# Patient Record
Sex: MALE | Race: WHITE | NOT HISPANIC OR LATINO | Employment: FULL TIME | URBAN - METROPOLITAN AREA
[De-identification: names, ages, dates, MRNs, and addresses within clinical notes are randomized per-mention and may not be internally consistent; named-entity substitution may affect disease eponyms.]

---

## 2021-03-12 ENCOUNTER — APPOINTMENT (OUTPATIENT)
Dept: GENERAL RADIOLOGY | Facility: HOSPITAL | Age: 61
End: 2021-03-12

## 2021-03-12 ENCOUNTER — HOSPITAL ENCOUNTER (EMERGENCY)
Facility: HOSPITAL | Age: 61
End: 2021-03-12

## 2021-03-12 ENCOUNTER — HOSPITAL ENCOUNTER (OUTPATIENT)
Facility: HOSPITAL | Age: 61
Setting detail: OBSERVATION
Discharge: HOME | End: 2021-03-13
Attending: EMERGENCY MEDICINE | Admitting: INTERNAL MEDICINE

## 2021-03-12 ENCOUNTER — APPOINTMENT (OUTPATIENT)
Dept: CT IMAGING | Facility: HOSPITAL | Age: 61
End: 2021-03-12

## 2021-03-12 DIAGNOSIS — F10.929 ALCOHOLIC INTOXICATION WITH COMPLICATION (HCC): ICD-10-CM

## 2021-03-12 DIAGNOSIS — S01.01XA LACERATION OF SCALP, INITIAL ENCOUNTER: ICD-10-CM

## 2021-03-12 DIAGNOSIS — S32.591A CLOSED FRACTURE OF RAMUS OF RIGHT PUBIS, INITIAL ENCOUNTER (HCC): Primary | ICD-10-CM
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
## 2021-03-12 LAB
ALBUMIN SERPL-MCNC: 4.5 G/DL (ref 3.5–5.2)
ALBUMIN/GLOB SERPL: 2 G/DL
ALP SERPL-CCNC: 75 U/L (ref 39–117)
ALT SERPL W P-5'-P-CCNC: 36 U/L (ref 1–41)
ANION GAP SERPL CALCULATED.3IONS-SCNC: 15.2 MMOL/L (ref 5–15)
AST SERPL-CCNC: 26 U/L (ref 1–40)
BASOPHILS # BLD AUTO: 0.06 10*3/MM3 (ref 0–0.2)
BASOPHILS NFR BLD AUTO: 0.6 % (ref 0–1.5)
BILIRUB SERPL-MCNC: 0.3 MG/DL (ref 0–1.2)
BUN SERPL-MCNC: 10 MG/DL (ref 8–23)
BUN/CREAT SERPL: 12.3 (ref 7–25)
CALCIUM SPEC-SCNC: 8.9 MG/DL (ref 8.6–10.5)
CHLORIDE SERPL-SCNC: 103 MMOL/L (ref 98–107)
CO2 SERPL-SCNC: 24.8 MMOL/L (ref 22–29)
CREAT SERPL-MCNC: 0.81 MG/DL (ref 0.76–1.27)
DEPRECATED RDW RBC AUTO: 40.6 FL (ref 37–54)
EOSINOPHIL # BLD AUTO: 0.36 10*3/MM3 (ref 0–0.4)
EOSINOPHIL NFR BLD AUTO: 3.6 % (ref 0.3–6.2)
ERYTHROCYTE [DISTWIDTH] IN BLOOD BY AUTOMATED COUNT: 11.8 % (ref 12.3–15.4)
ETHANOL BLD-MCNC: 177 MG/DL (ref 0–10)
ETHANOL UR QL: 0.18 %
GFR SERPL CREATININE-BSD FRML MDRD: 97 ML/MIN/1.73
GLOBULIN UR ELPH-MCNC: 2.2 GM/DL
GLUCOSE SERPL-MCNC: 133 MG/DL (ref 65–99)
HCT VFR BLD AUTO: 41.7 % (ref 37.5–51)
HGB BLD-MCNC: 14.4 G/DL (ref 13–17.7)
IMM GRANULOCYTES # BLD AUTO: 0.14 10*3/MM3 (ref 0–0.05)
IMM GRANULOCYTES NFR BLD AUTO: 1.4 % (ref 0–0.5)
LYMPHOCYTES # BLD AUTO: 1.93 10*3/MM3 (ref 0.7–3.1)
LYMPHOCYTES NFR BLD AUTO: 19.5 % (ref 19.6–45.3)
MCH RBC QN AUTO: 32.7 PG (ref 26.6–33)
MCHC RBC AUTO-ENTMCNC: 34.5 G/DL (ref 31.5–35.7)
MCV RBC AUTO: 94.8 FL (ref 79–97)
MONOCYTES # BLD AUTO: 0.6 10*3/MM3 (ref 0.1–0.9)
MONOCYTES NFR BLD AUTO: 6.1 % (ref 5–12)
NEUTROPHILS NFR BLD AUTO: 6.81 10*3/MM3 (ref 1.7–7)
NEUTROPHILS NFR BLD AUTO: 68.8 % (ref 42.7–76)
NRBC BLD AUTO-RTO: 0 /100 WBC (ref 0–0.2)
PLATELET # BLD AUTO: 246 10*3/MM3 (ref 140–450)
PMV BLD AUTO: 8.9 FL (ref 6–12)
POTASSIUM SERPL-SCNC: 3.9 MMOL/L (ref 3.5–5.2)
PROT SERPL-MCNC: 6.7 G/DL (ref 6–8.5)
RBC # BLD AUTO: 4.4 10*6/MM3 (ref 4.14–5.8)
SODIUM SERPL-SCNC: 143 MMOL/L (ref 136–145)
WBC # BLD AUTO: 9.9 10*3/MM3 (ref 3.4–10.8)

## 2021-03-12 PROCEDURE — 73502 X-RAY EXAM HIP UNI 2-3 VIEWS: CPT

## 2021-03-12 PROCEDURE — 90715 TDAP VACCINE 7 YRS/> IM: CPT | Performed by: EMERGENCY MEDICINE

## 2021-03-12 PROCEDURE — 99285 EMERGENCY DEPT VISIT HI MDM: CPT

## 2021-03-12 PROCEDURE — 85025 COMPLETE CBC W/AUTO DIFF WBC: CPT | Performed by: EMERGENCY MEDICINE

## 2021-03-12 PROCEDURE — 25010000002 TDAP 5-2.5-18.5 LF-MCG/0.5 SUSPENSION: Performed by: EMERGENCY MEDICINE

## 2021-03-12 PROCEDURE — 82077 ASSAY SPEC XCP UR&BREATH IA: CPT | Performed by: EMERGENCY MEDICINE

## 2021-03-12 PROCEDURE — 72125 CT NECK SPINE W/O DYE: CPT

## 2021-03-12 PROCEDURE — 80053 COMPREHEN METABOLIC PANEL: CPT | Performed by: EMERGENCY MEDICINE

## 2021-03-12 PROCEDURE — 70450 CT HEAD/BRAIN W/O DYE: CPT

## 2021-03-12 PROCEDURE — 90471 IMMUNIZATION ADMIN: CPT | Performed by: EMERGENCY MEDICINE

## 2021-03-12 RX ORDER — SODIUM CHLORIDE 0.9 % (FLUSH) 0.9 %
10 SYRINGE (ML) INJECTION AS NEEDED
Status: DISCONTINUED | OUTPATIENT
Start: 2021-03-12 | End: 2021-03-13 | Stop reason: HOSPADM

## 2021-03-12 RX ORDER — LIDOCAINE HYDROCHLORIDE 10 MG/ML
10 INJECTION, SOLUTION EPIDURAL; INFILTRATION; INTRACAUDAL; PERINEURAL ONCE
Status: COMPLETED | OUTPATIENT
Start: 2021-03-12 | End: 2021-03-12

## 2021-03-12 RX ADMIN — TETANUS TOXOID, REDUCED DIPHTHERIA TOXOID AND ACELLULAR PERTUSSIS VACCINE, ADSORBED 0.5 ML: 5; 2.5; 8; 8; 2.5 SUSPENSION INTRAMUSCULAR at 22:19

## 2021-03-12 RX ADMIN — LIDOCAINE HYDROCHLORIDE 10 ML: 10 INJECTION, SOLUTION EPIDURAL; INFILTRATION; INTRACAUDAL; PERINEURAL at 21:55

## 2021-03-13 ENCOUNTER — APPOINTMENT (OUTPATIENT)
Dept: GENERAL RADIOLOGY | Facility: HOSPITAL | Age: 61
End: 2021-03-13

## 2021-03-13 ENCOUNTER — APPOINTMENT (OUTPATIENT)
Dept: CT IMAGING | Facility: HOSPITAL | Age: 61
End: 2021-03-13

## 2021-03-13 VITALS
HEIGHT: 70 IN | TEMPERATURE: 97.3 F | HEART RATE: 93 BPM | OXYGEN SATURATION: 97 % | BODY MASS INDEX: 31.53 KG/M2 | SYSTOLIC BLOOD PRESSURE: 158 MMHG | WEIGHT: 220.24 LBS | RESPIRATION RATE: 16 BRPM | DIASTOLIC BLOOD PRESSURE: 87 MMHG

## 2021-03-13 PROBLEM — I10 HYPERTENSION: Status: ACTIVE | Noted: 2021-03-13

## 2021-03-13 PROBLEM — S32.591A CLOSED FRACTURE OF RAMUS OF RIGHT PUBIS: Status: ACTIVE | Noted: 2021-03-13

## 2021-03-13 PROBLEM — F10.10 ETOH ABUSE: Status: ACTIVE | Noted: 2021-03-13

## 2021-03-13 LAB
ANION GAP SERPL CALCULATED.3IONS-SCNC: 11.5 MMOL/L (ref 5–15)
BUN SERPL-MCNC: 11 MG/DL (ref 8–23)
BUN/CREAT SERPL: 15.1 (ref 7–25)
CALCIUM SPEC-SCNC: 8.6 MG/DL (ref 8.6–10.5)
CHLORIDE SERPL-SCNC: 104 MMOL/L (ref 98–107)
CO2 SERPL-SCNC: 25.5 MMOL/L (ref 22–29)
CREAT SERPL-MCNC: 0.73 MG/DL (ref 0.76–1.27)
DEPRECATED RDW RBC AUTO: 38.7 FL (ref 37–54)
ERYTHROCYTE [DISTWIDTH] IN BLOOD BY AUTOMATED COUNT: 11.7 % (ref 12.3–15.4)
GFR SERPL CREATININE-BSD FRML MDRD: 109 ML/MIN/1.73
GLUCOSE SERPL-MCNC: 117 MG/DL (ref 65–99)
HCT VFR BLD AUTO: 37.9 % (ref 37.5–51)
HGB BLD-MCNC: 13.2 G/DL (ref 13–17.7)
MCH RBC QN AUTO: 31.7 PG (ref 26.6–33)
MCHC RBC AUTO-ENTMCNC: 34.8 G/DL (ref 31.5–35.7)
MCV RBC AUTO: 91.1 FL (ref 79–97)
PLATELET # BLD AUTO: 236 10*3/MM3 (ref 140–450)
PMV BLD AUTO: 9 FL (ref 6–12)
POTASSIUM SERPL-SCNC: 4.1 MMOL/L (ref 3.5–5.2)
RBC # BLD AUTO: 4.16 10*6/MM3 (ref 4.14–5.8)
SARS-COV-2 ORF1AB RESP QL NAA+PROBE: NOT DETECTED
SODIUM SERPL-SCNC: 141 MMOL/L (ref 136–145)
WBC # BLD AUTO: 13.41 10*3/MM3 (ref 3.4–10.8)

## 2021-03-13 PROCEDURE — 25010000002 HYDROMORPHONE PER 4 MG: Performed by: HOSPITALIST

## 2021-03-13 PROCEDURE — 36415 COLL VENOUS BLD VENIPUNCTURE: CPT | Performed by: NURSE PRACTITIONER

## 2021-03-13 PROCEDURE — G0378 HOSPITAL OBSERVATION PER HR: HCPCS

## 2021-03-13 PROCEDURE — 72170 X-RAY EXAM OF PELVIS: CPT

## 2021-03-13 PROCEDURE — 25010000002 MORPHINE PER 10 MG: Performed by: EMERGENCY MEDICINE

## 2021-03-13 PROCEDURE — 25010000002 MORPHINE PER 10 MG: Performed by: NURSE PRACTITIONER

## 2021-03-13 PROCEDURE — 96361 HYDRATE IV INFUSION ADD-ON: CPT

## 2021-03-13 PROCEDURE — 85027 COMPLETE CBC AUTOMATED: CPT | Performed by: NURSE PRACTITIONER

## 2021-03-13 PROCEDURE — 63710000001 ONDANSETRON PER 8 MG: Performed by: NURSE PRACTITIONER

## 2021-03-13 PROCEDURE — 96375 TX/PRO/DX INJ NEW DRUG ADDON: CPT

## 2021-03-13 PROCEDURE — 96365 THER/PROPH/DIAG IV INF INIT: CPT

## 2021-03-13 PROCEDURE — 96376 TX/PRO/DX INJ SAME DRUG ADON: CPT

## 2021-03-13 PROCEDURE — 80048 BASIC METABOLIC PNL TOTAL CA: CPT | Performed by: NURSE PRACTITIONER

## 2021-03-13 PROCEDURE — 74176 CT ABD & PELVIS W/O CONTRAST: CPT

## 2021-03-13 PROCEDURE — 25010000002 THIAMINE PER 100 MG: Performed by: NURSE PRACTITIONER

## 2021-03-13 PROCEDURE — 25010000002 ONDANSETRON PER 1 MG: Performed by: EMERGENCY MEDICINE

## 2021-03-13 PROCEDURE — U0004 COV-19 TEST NON-CDC HGH THRU: HCPCS | Performed by: EMERGENCY MEDICINE

## 2021-03-13 PROCEDURE — 97161 PT EVAL LOW COMPLEX 20 MIN: CPT

## 2021-03-13 PROCEDURE — 97110 THERAPEUTIC EXERCISES: CPT

## 2021-03-13 PROCEDURE — 99203 OFFICE O/P NEW LOW 30 MIN: CPT | Performed by: ORTHOPAEDIC SURGERY

## 2021-03-13 RX ORDER — SODIUM CHLORIDE 0.9 % (FLUSH) 0.9 %
10 SYRINGE (ML) INJECTION AS NEEDED
Status: DISCONTINUED | OUTPATIENT
Start: 2021-03-13 | End: 2021-03-13 | Stop reason: HOSPADM

## 2021-03-13 RX ORDER — AMLODIPINE BESYLATE 2.5 MG/1
2.5 TABLET ORAL DAILY
COMMUNITY
Start: 2021-03-05 | End: 2021-05-21

## 2021-03-13 RX ORDER — HYDROMORPHONE HYDROCHLORIDE 1 MG/ML
0.5 INJECTION, SOLUTION INTRAMUSCULAR; INTRAVENOUS; SUBCUTANEOUS EVERY 4 HOURS PRN
Status: DISCONTINUED | OUTPATIENT
Start: 2021-03-13 | End: 2021-03-13

## 2021-03-13 RX ORDER — LORAZEPAM 1 MG/1
1 TABLET ORAL
Status: DISCONTINUED | OUTPATIENT
Start: 2021-03-13 | End: 2021-03-13 | Stop reason: HOSPADM

## 2021-03-13 RX ORDER — LORAZEPAM 1 MG/1
2 TABLET ORAL
Status: DISCONTINUED | OUTPATIENT
Start: 2021-03-13 | End: 2021-03-13 | Stop reason: HOSPADM

## 2021-03-13 RX ORDER — HYDROCODONE BITARTRATE AND ACETAMINOPHEN 7.5; 325 MG/1; MG/1
1 TABLET ORAL EVERY 4 HOURS PRN
Status: DISCONTINUED | OUTPATIENT
Start: 2021-03-13 | End: 2021-03-13 | Stop reason: HOSPADM

## 2021-03-13 RX ORDER — FOLIC ACID 1 MG/1
1 TABLET ORAL DAILY
Status: DISCONTINUED | OUTPATIENT
Start: 2021-03-14 | End: 2021-03-13 | Stop reason: HOSPADM

## 2021-03-13 RX ORDER — ASPIRIN 81 MG/1
81 TABLET, CHEWABLE ORAL DAILY
Status: DISCONTINUED | OUTPATIENT
Start: 2021-03-13 | End: 2021-03-13 | Stop reason: HOSPADM

## 2021-03-13 RX ORDER — SODIUM CHLORIDE 9 MG/ML
75 INJECTION, SOLUTION INTRAVENOUS CONTINUOUS
Status: DISCONTINUED | OUTPATIENT
Start: 2021-03-13 | End: 2021-03-13

## 2021-03-13 RX ORDER — MORPHINE SULFATE 2 MG/ML
2 INJECTION, SOLUTION INTRAMUSCULAR; INTRAVENOUS
Status: DISCONTINUED | OUTPATIENT
Start: 2021-03-13 | End: 2021-03-13 | Stop reason: HOSPADM

## 2021-03-13 RX ORDER — ACETAMINOPHEN 160 MG/5ML
650 SOLUTION ORAL EVERY 4 HOURS PRN
Status: DISCONTINUED | OUTPATIENT
Start: 2021-03-13 | End: 2021-03-13 | Stop reason: HOSPADM

## 2021-03-13 RX ORDER — LORAZEPAM 2 MG/ML
1 INJECTION INTRAMUSCULAR
Status: DISCONTINUED | OUTPATIENT
Start: 2021-03-13 | End: 2021-03-13 | Stop reason: HOSPADM

## 2021-03-13 RX ORDER — CALCIUM CARBONATE 200(500)MG
2 TABLET,CHEWABLE ORAL 2 TIMES DAILY PRN
Status: DISCONTINUED | OUTPATIENT
Start: 2021-03-13 | End: 2021-03-13 | Stop reason: HOSPADM

## 2021-03-13 RX ORDER — ONDANSETRON 2 MG/ML
4 INJECTION INTRAMUSCULAR; INTRAVENOUS EVERY 6 HOURS PRN
Status: DISCONTINUED | OUTPATIENT
Start: 2021-03-13 | End: 2021-03-13 | Stop reason: HOSPADM

## 2021-03-13 RX ORDER — HYDROCODONE BITARTRATE AND ACETAMINOPHEN 7.5; 325 MG/1; MG/1
1 TABLET ORAL EVERY 4 HOURS PRN
Qty: 15 TABLET | Refills: 0 | Status: SHIPPED | OUTPATIENT
Start: 2021-03-13 | End: 2021-03-13

## 2021-03-13 RX ORDER — DIPHENOXYLATE HYDROCHLORIDE AND ATROPINE SULFATE 2.5; .025 MG/1; MG/1
1 TABLET ORAL DAILY
Status: DISCONTINUED | OUTPATIENT
Start: 2021-03-14 | End: 2021-03-13 | Stop reason: HOSPADM

## 2021-03-13 RX ORDER — ASPIRIN 81 MG/1
81 TABLET, CHEWABLE ORAL DAILY
COMMUNITY

## 2021-03-13 RX ORDER — ONDANSETRON 2 MG/ML
4 INJECTION INTRAMUSCULAR; INTRAVENOUS ONCE
Status: COMPLETED | OUTPATIENT
Start: 2021-03-13 | End: 2021-03-13

## 2021-03-13 RX ORDER — HYDROCODONE BITARTRATE AND ACETAMINOPHEN 5; 325 MG/1; MG/1
1 TABLET ORAL EVERY 6 HOURS PRN
Status: DISCONTINUED | OUTPATIENT
Start: 2021-03-13 | End: 2021-03-13

## 2021-03-13 RX ORDER — ACETAMINOPHEN 325 MG/1
650 TABLET ORAL EVERY 4 HOURS PRN
Status: DISCONTINUED | OUTPATIENT
Start: 2021-03-13 | End: 2021-03-13 | Stop reason: HOSPADM

## 2021-03-13 RX ORDER — LORAZEPAM 2 MG/ML
2 INJECTION INTRAMUSCULAR
Status: DISCONTINUED | OUTPATIENT
Start: 2021-03-13 | End: 2021-03-13 | Stop reason: HOSPADM

## 2021-03-13 RX ORDER — SODIUM CHLORIDE 0.9 % (FLUSH) 0.9 %
10 SYRINGE (ML) INJECTION EVERY 12 HOURS SCHEDULED
Status: DISCONTINUED | OUTPATIENT
Start: 2021-03-13 | End: 2021-03-13 | Stop reason: HOSPADM

## 2021-03-13 RX ORDER — MORPHINE SULFATE 2 MG/ML
4 INJECTION, SOLUTION INTRAMUSCULAR; INTRAVENOUS ONCE
Status: COMPLETED | OUTPATIENT
Start: 2021-03-13 | End: 2021-03-13

## 2021-03-13 RX ORDER — DOCUSATE SODIUM 100 MG/1
100 CAPSULE, LIQUID FILLED ORAL 2 TIMES DAILY
Qty: 14 CAPSULE | Refills: 0 | Status: SHIPPED | OUTPATIENT
Start: 2021-03-13 | End: 2021-03-20

## 2021-03-13 RX ORDER — BISACODYL 5 MG/1
5 TABLET, DELAYED RELEASE ORAL DAILY PRN
Status: DISCONTINUED | OUTPATIENT
Start: 2021-03-13 | End: 2021-03-13 | Stop reason: HOSPADM

## 2021-03-13 RX ORDER — ONDANSETRON 4 MG/1
4 TABLET, FILM COATED ORAL EVERY 6 HOURS PRN
Status: DISCONTINUED | OUTPATIENT
Start: 2021-03-13 | End: 2021-03-13 | Stop reason: HOSPADM

## 2021-03-13 RX ORDER — AMLODIPINE BESYLATE 2.5 MG/1
2.5 TABLET ORAL DAILY
Status: DISCONTINUED | OUTPATIENT
Start: 2021-03-13 | End: 2021-03-13 | Stop reason: HOSPADM

## 2021-03-13 RX ORDER — MORPHINE SULFATE 2 MG/ML
2 INJECTION, SOLUTION INTRAMUSCULAR; INTRAVENOUS ONCE
Status: DISCONTINUED | OUTPATIENT
Start: 2021-03-13 | End: 2021-03-13

## 2021-03-13 RX ORDER — DOCUSATE SODIUM 100 MG/1
100 CAPSULE, LIQUID FILLED ORAL 2 TIMES DAILY
Qty: 14 CAPSULE | Refills: 0 | Status: SHIPPED | OUTPATIENT
Start: 2021-03-13 | End: 2021-03-13 | Stop reason: SDUPTHER

## 2021-03-13 RX ORDER — HYDROCODONE BITARTRATE AND ACETAMINOPHEN 7.5; 325 MG/1; MG/1
1 TABLET ORAL EVERY 4 HOURS PRN
Qty: 15 TABLET | Refills: 0 | Status: SHIPPED | OUTPATIENT
Start: 2021-03-13 | End: 2021-04-16

## 2021-03-13 RX ORDER — ACETAMINOPHEN 650 MG/1
650 SUPPOSITORY RECTAL EVERY 4 HOURS PRN
Status: DISCONTINUED | OUTPATIENT
Start: 2021-03-13 | End: 2021-03-13 | Stop reason: HOSPADM

## 2021-03-13 RX ADMIN — SODIUM CHLORIDE, PRESERVATIVE FREE 10 ML: 5 INJECTION INTRAVENOUS at 00:20

## 2021-03-13 RX ADMIN — ONDANSETRON 4 MG: 2 INJECTION INTRAMUSCULAR; INTRAVENOUS at 00:19

## 2021-03-13 RX ADMIN — MORPHINE SULFATE 2 MG: 2 INJECTION, SOLUTION INTRAMUSCULAR; INTRAVENOUS at 03:02

## 2021-03-13 RX ADMIN — THIAMINE HYDROCHLORIDE 100 MG: 100 INJECTION, SOLUTION INTRAMUSCULAR; INTRAVENOUS at 03:04

## 2021-03-13 RX ADMIN — MORPHINE SULFATE 4 MG: 2 INJECTION, SOLUTION INTRAMUSCULAR; INTRAVENOUS at 00:20

## 2021-03-13 RX ADMIN — AMLODIPINE BESYLATE 2.5 MG: 2.5 TABLET ORAL at 08:56

## 2021-03-13 RX ADMIN — ACETAMINOPHEN 650 MG: 325 TABLET, FILM COATED ORAL at 03:03

## 2021-03-13 RX ADMIN — HYDROCODONE BITARTRATE AND ACETAMINOPHEN 1 TABLET: 7.5; 325 TABLET ORAL at 13:35

## 2021-03-13 RX ADMIN — ONDANSETRON HYDROCHLORIDE 4 MG: 4 TABLET, FILM COATED ORAL at 03:02

## 2021-03-13 RX ADMIN — SODIUM CHLORIDE, PRESERVATIVE FREE 10 ML: 5 INJECTION INTRAVENOUS at 08:56

## 2021-03-13 RX ADMIN — ASPIRIN 81 MG: 81 TABLET, CHEWABLE ORAL at 08:56

## 2021-03-13 RX ADMIN — SODIUM CHLORIDE 75 ML/HR: 9 INJECTION, SOLUTION INTRAVENOUS at 06:22

## 2021-03-13 RX ADMIN — HYDROCODONE BITARTRATE AND ACETAMINOPHEN 1 TABLET: 5; 325 TABLET ORAL at 08:56

## 2021-03-13 RX ADMIN — SODIUM CHLORIDE, PRESERVATIVE FREE 10 ML: 5 INJECTION INTRAVENOUS at 03:06

## 2021-03-13 RX ADMIN — HYDROCODONE BITARTRATE AND ACETAMINOPHEN 1 TABLET: 7.5; 325 TABLET ORAL at 17:40

## 2021-03-13 RX ADMIN — HYDROMORPHONE HYDROCHLORIDE 0.5 MG: 1 INJECTION, SOLUTION INTRAMUSCULAR; INTRAVENOUS; SUBCUTANEOUS at 07:28

## 2021-03-13 NOTE — PLAN OF CARE
Goal Outcome Evaluation:  Plan of Care Reviewed With: patient  Progress: no change  Outcome Summary: 61 y/o admit for mechanical fall down basement stairs tonight. Amnesic to events. wife called EMS. ETOH on board on arrival. Dr davenport consulted for ortho, closed FX of right ramus/pubis. bedfast and NPO since MN. VSS. will monitor.

## 2021-03-13 NOTE — ED NOTES
Nursing report ED to floor  Arpit Godoy  60 y.o.  male    HPI (triage note):   Chief Complaint   Patient presents with   • Fall   • Alcohol Intoxication       Admitting doctor:   Scooter Villagomez MD    Admitting diagnosis:   The primary encounter diagnosis was Closed fracture of ramus of right pubis, initial encounter (CMS/[facility]). Diagnoses of Laceration of scalp, initial encounter, Alcoholic intoxication with complication (CMS/[facility]), and Fall down stairs, initial encounter were also pertinent to this visit.    Code status:   Current Code Status     Date Active Code Status Order ID Comments User Context       Not on file    Advance Care Planning Activity          Allergies:   Patient has no known allergies.    Weight:       03/12/21  2143   Weight: 99.8 kg (220 lb)       Most recent vitals:   Vitals:    03/12/21 2200 03/12/21 2300 03/12/21 2305 03/13/21 0024   BP: 150/81 162/83  142/77   Pulse:    101   Resp:       Temp:       TempSrc:       SpO2: 95% 96% 97% 93%   Weight:       Height:           Active LDAs/IV Access:   Lines, Drains & Airways    Active LDAs     Name:   Placement date:   Placement time:   Site:   Days:    Peripheral IV 03/13/21 0019 Right Hand   03/13/21    0019    Hand   less than 1                Labs (abnormal labs have a star):   Labs Reviewed   COMPREHENSIVE METABOLIC PANEL - Abnormal; Notable for the following components:       Result Value    Glucose 133 (*)     Anion Gap 15.2 (*)     All other components within normal limits    Narrative:     GFR Normal >60  Chronic Kidney Disease <60  Kidney Failure <15     ETHANOL - Abnormal; Notable for the following components:    Ethanol 177 (*)     All other components within normal limits   CBC WITH AUTO DIFFERENTIAL - Abnormal; Notable for the following components:    RDW 11.8 (*)     Lymphocyte % 19.5 (*)     Immature Grans % 1.4 (*)     Immature Grans, Absolute 0.14 (*)     All other components within normal limits   COVID PRE-OP /  PRE-PROCEDURE SCREENING ORDER (NO ISOLATION)    Narrative:     The following orders were created for panel order COVID PRE-OP / PRE-PROCEDURE SCREENING ORDER (NO ISOLATION) - Swab, Nasopharynx.  Procedure                               Abnormality         Status                     ---------                               -----------         ------                     COVID-19,APTIMA PANTHER,...[[id number]]                                                   Please view results for these tests on the individual orders.   COVID-19,APTIMA PANTHER,[facility] IN-HOUSE,NP/OP SWAB IN UTM/VTM/SALINE TRANSPORT MEDIA,24 HR TAT   CBC AND DIFFERENTIAL    Narrative:     The following orders were created for panel order CBC & Differential.  Procedure                               Abnormality         Status                     ---------                               -----------         ------                     CBC Auto Differential[[id number]]        Abnormal            Final result                 Please view results for these tests on the individual orders.       EKG:   No orders to display       Meds given in ED:   Medications   sodium chloride 0.9 % flush 10 mL (10 mL Intravenous Given 3/13/21 0020)   lidocaine PF 1% (XYLOCAINE) injection 10 mL (10 mL Other Given 3/12/21 2155)   Tdap (BOOSTRIX) injection 0.5 mL (0.5 mL Intramuscular Given 3/12/21 2219)   morphine injection 4 mg (4 mg Intravenous Given 3/13/21 0020)   ondansetron (ZOFRAN) injection 4 mg (4 mg Intravenous Given 3/13/21 0019)       Imaging results:  CT Head Without Contrast    Result Date: 3/12/2021  Electronically signed by Yulisa Nur M.D. on 03-12-21 at 2320    CT Cervical Spine Without Contrast    Result Date: 3/12/2021  Electronically signed by Moe Harrison M.D. on 03-12-21 at 2330    XR Hip With or Without Pelvis 2 - 3 View Right    Result Date: 3/12/2021  Electronically signed by Yulisa Nur M.D. on 03-12-21 at 2308      Ambulatory status:   -     Social issues:    Social History     Socioeconomic History   • Marital status:      Spouse name: Not on file   • Number of children: Not on file   • Years of education: Not on file   • Highest education level: Not on file   Tobacco Use   • Smoking status: Current Every Day Smoker     Types: Cigarettes   • Smokeless tobacco: Never Used   Substance and Sexual Activity   • Alcohol use: Yes   • Drug use: Never    Nursing report ED to floor       Maite Rivas RN  03/13/21 0055     n/a

## 2021-03-13 NOTE — ED PROVIDER NOTES
Laceration Repair    Date/Time: 3/13/2021 1:19 AM  Performed by: Jono Kahn III, PA  Authorized by: Ricardo Hollins MD     Consent:     Consent obtained:  Verbal    Risks discussed:  Infection and pain  Anesthesia (see MAR for exact dosages):     Anesthesia method:  Local infiltration    Local anesthetic:  Lidocaine 1% WITH epi  Laceration details:     Location:  Scalp    Scalp location:  Crown    Length (cm):  3.5 (Does not shave, no galea involvement)  Repair type:     Repair type:  Simple  Pre-procedure details:     Preparation:  Patient was prepped and draped in usual sterile fashion and imaging obtained to evaluate for foreign bodies  Exploration:     Wound exploration: wound explored through full range of motion and entire depth of wound probed and visualized    Treatment:     Area cleansed with:  Hibiclens and saline    Amount of cleaning:  Extensive    Irrigation volume:  750    Irrigation method:  Pressure wash  Skin repair:     Repair method:  Staples    Number of staples:  4  Approximation:     Approximation:  Close  Post-procedure details:     Dressing:  Antibiotic ointment    Patient tolerance of procedure:  Tolerated well, no immediate complications         Jono Kahn III, PA  03/13/21 0121

## 2021-03-13 NOTE — PLAN OF CARE
Problem: Adult Inpatient Plan of Care  Goal: Plan of Care Review  Outcome: Ongoing, Progressing  Flowsheets (Taken 3/13/2021 1617)  Progress: improving  Plan of Care Reviewed With: patient   Goal Outcome Evaluation:  Plan of Care Reviewed With: patient  Progress: improving     Pt arrived on the floor with a fx to the ramus of the pubis. He has been a great deal of pain all day. His pain medication was increased and he says he is doing better and would like to go home. Ortho was consulted and ordered a CT scan of the abdomin and pelvis. If ortho is okay with discharge pt can go home today and he said even this evening he would still like to go. Pt worked with pt and got him a walker to use at home. VSS. Will continue to monitor.

## 2021-03-13 NOTE — THERAPY EVALUATION
Patient Name: Arpit Godoy  : 1960    MRN: [id number]                              Today's Date: 3/13/2021       Admit Date: 3/12/2021    Visit Dx:     ICD-10-CM ICD-9-CM   1. Closed fracture of ramus of right pubis, initial encounter (CMS/[facility])  S32.591A 808.2   2. Laceration of scalp, initial encounter  S01.01XA 873.0   3. Alcoholic intoxication with complication (CMS/[facility])  F10.929 305.00   4. Fall down stairs, initial encounter  W10.8XXA E880.9     Patient Active Problem List   Diagnosis   • Closed fracture of ramus of right pubis (CMS/[facility])   • Hypertension   • ETOH abuse     Past Medical History:   Diagnosis Date   • Hypertension      Past Surgical History:   Procedure Laterality Date   • APPENDECTOMY       General Information     Row Name 21 1146          Physical Therapy Time and Intention    Document Type  evaluation;discharge evaluation/summary  -AL     Mode of Treatment  physical therapy  -AL     Row Name 21 1146          General Information    Patient Profile Reviewed  yes  -AL     Prior Level of Function  independent:  -AL     Existing Precautions/Restrictions  no known precautions/restrictions  -AL     Barriers to Rehab  none identified  -AL     Row Name 21 1146          Living Environment    Lives With  spouse  -AL     Row Name 21 1146          Home Main Entrance    Number of Stairs, Main Entrance  none  -AL       User Key  (r) = Recorded By, (t) = Taken By, (c) = Cosigned By    Initials Name Provider Type    AL Lien Loomis, PT Physical Therapist        Mobility     Row Name 21 1147          Bed Mobility    Bed Mobility  bed mobility (all) activities  -AL     All Activities, [place] (Bed Mobility)  modified independence  -AL     Row Name 21 1147          Sit-Stand Transfer    Sit-Stand [place] (Transfers)  supervision  -AL     Assistive Device (Sit-Stand Transfers)  walker, front-wheeled  -AL     Row Name 21 1147          Gait/Stairs  (Locomotion)    [place] Level (Gait)  contact guard  -AL     Assistive Device (Gait)  walker, front-wheeled  -AL     Distance in Feet (Gait)  100  -AL     Comment (Gait/Stairs)  no LOB. Not able to put a lot of wt on RLE.  -AL     Row Name 03/13/21 1147          Mobility    Extremity Weight-bearing Status  right upper extremity  -AL     Right Upper Extremity (Weight-bearing Status)  weight-bearing as tolerated (WBAT)  -AL       User Key  (r) = Recorded By, (t) = Taken By, (c) = Cosigned By    Initials Name Provider Type    Lien Chu, PT Physical Therapist        Obj/Interventions     Row Name 03/13/21 1148          Range of Motion Comprehensive    Comment, General Range of Motion  decreased on RLE.  -AL     Row Name 03/13/21 1148          Strength Comprehensive (MMT)    Comment, General Manual Muscle Testing (MMT) Assessment  decreased on RLE.  -AL       User Key  (r) = Recorded By, (t) = Taken By, (c) = Cosigned By    Initials Name Provider Type    Lien Chu, PT Physical Therapist        Goals/Plan    No documentation.       Clinical Impression     Row Name 03/13/21 1148          Pain    Additional Documentation  Pain Scale: Numbers Pre/Post-Treatment (Group)  -AL     Row Name 03/13/21 1148          Pain Scale: Numbers Pre/Post-Treatment    Pretreatment Pain Rating  5/10  -AL     Posttreatment Pain Rating  7/10  -AL     Pain Location - Side  Right  -AL     Pain Location - Orientation  lower  -AL     Pain Location  hip  -AL     Pain Intervention(s)  Ambulation/increased activity;Repositioned  -AL     Row Name 03/13/21 1148          Plan of Care Review    Plan of Care Reviewed With  patient  -AL     Row Name 03/13/21 1148          Therapy Assessment/Plan (PT)    Rehab Potential (PT)  good, to achieve stated therapy goals  -AL     Criteria for Skilled Interventions Met (PT)  no  -AL     Row Name 03/13/21 1148          Positioning and Restraints    Pre-Treatment Position  in bed  -AL     Post Treatment  Position  bed  -AL     In Bed  notified nsg;sitting EOB;call light within reach;encouraged to call for assist  -AL       User Key  (r) = Recorded By, (t) = Taken By, (c) = Cosigned By    Initials Name Provider Type    Lien Chu, PT Physical Therapist        Outcome Measures     Row Name 03/13/21 1149          How much help from another person do you currently need...    Turning from your back to your side while in flat bed without using bedrails?  4  -AL     Moving from lying on back to sitting on the side of a flat bed without bedrails?  4  -AL     Moving to and from a bed to a chair (including a wheelchair)?  4  -AL     Standing up from a chair using your arms (e.g., wheelchair, bedside chair)?  4  -AL     Climbing 3-5 steps with a railing?  3  -AL     To walk in hospital room?  3  -AL     AM-PAC 6 Clicks Score (PT)  22  -AL     Row Name 03/13/21 1141          Functional Assessment    Outcome Measure Options  AM-PAC 6 Clicks Basic Mobility (PT)  -AL       User Key  (r) = Recorded By, (t) = Taken By, (c) = Cosigned By    Initials Name Provider Type    Lien Chu, PT Physical Therapist        Physical Therapy Education                 Title: PT OT SLP Therapies (Resolved)     Topic: Physical Therapy (Resolved)     Point: Mobility training (Resolved)     Learner Progress:  Not documented in this visit.          Point: Home exercise program (Resolved)     Learner Progress:  Not documented in this visit.          Point: Body mechanics (Resolved)     Learner Progress:  Not documented in this visit.          Point: Precautions (Resolved)     Learner Progress:  Not documented in this visit.                          PT Recommendation and Plan     Plan of Care Reviewed With: patient  Outcome Summary: Skip is not appropriate for skilled PT at this time. He is functional with bed mobility, transfer, and gait; pain is the limiting factor. Once he can be on a good pain control regimen, he would be good to return  home from PT standpoint. Looking into Rwx for home use. Will not  patient on PT caseload at this time. PT wore mask, gloves, and face shield.     Time Calculation:   PT Charges     Row Name 03/13/21 1150             Time Calculation    Start Time  1118  -AL      Stop Time  1128  -AL      Time Calculation (min)  10 min  -AL      PT Received On  03/13/21  -AL        User Key  (r) = Recorded By, (t) = Taken By, (c) = Cosigned By    Initials Name Provider Type    AL Lien Loomis, PT Physical Therapist        Therapy Charges for Today     Code Description Service Date Service Provider Modifiers Qty    54243194609 HC PT EVAL LOW COMPLEXITY 2 3/13/2021 Lien Loomis, PT GP 1    73330073392 HC PT THER PROC EA 15 MIN 3/13/2021 Lien Loomis, PT GP 1          PT G-Codes  Outcome Measure Options: AM-PAC 6 Clicks Basic Mobility (PT)  AM-PAC 6 Clicks Score (PT): 22    Lien Loomis PT  3/13/2021

## 2021-03-13 NOTE — ED NOTES
I wore full protective equipment throughout this patient encounter including a face mask, goggles, and gloves. Hand hygiene was performed before donning protective equipment and after removal when leaving the room.       Maite Rivas RN  03/12/21 2506

## 2021-03-13 NOTE — ED TRIAGE NOTES
Pt from home by ems with complaints of falling down stairs and currently has a lac to back of head. Wife found pt deep asleep in basement post fall. Pt tells ems he had four beers tonight. Denies any other complaints or pain. NAD.     I wore full protective equipment throughout this patient encounter including a face mask, goggles, and gloves. Hand hygiene was performed before donning protective equipment and after removal when leaving the room.

## 2021-03-13 NOTE — ED NOTES
Pt was evaluated with use of walker, and determined to be in too much pain to walk. Spoke with pt.     Remy Uriarte, PCT  03/13/21 0001     [facility] calling to ask about the visitor requesting accommodations form. Please call to assist.

## 2021-03-13 NOTE — H&P
Patient Name:  Arpit Godoy  YOB: 1960  MRN:  [id number]  Admit Date:  3/12/2021  Patient Care Team:  Provider, No Known as PCP - General      Subjective   History Present Illness     Chief Complaint   Patient presents with   • Fall   • Alcohol Intoxication       Mr. Godoy is a 60 y.o. former smoker with a history of hypertension and alcohol abuse that presents to [facility] complaining of a fall.  He reports he was carrying food down a flight of stairs when he tripped and fell to the bottom.  He reports he hit the back of his head and he thinks he lost consciousness briefly. Per ED notes, the patient's family members found him at the bottom of the stairs asleep.  He reports right pelvic pain that is described as intermittent, moderate, and sharp in nature.  He states movement exacerbates the pain and he denies alleviating factors.  X-ray of the right hip revealed a mildly displaced fracture of the right inferior abdomen and superior pubic rami.  CT of the head revealed a right parietal scalp hematoma and laceration, but was negative for an acute intracranial process.  He reports he is a daily drinker and consumes 6 to 8 beers daily.  He denies a history of alcohol withdrawal.      History of Present Illness  Review of Systems   Constitutional: Negative for chills and fever.   HENT: Negative for congestion and sore throat.    Eyes: Negative for photophobia and visual disturbance.   Respiratory: Negative for cough and shortness of breath.    Cardiovascular: Negative for chest pain and leg swelling.   Gastrointestinal: Negative for abdominal pain, nausea and vomiting.   Endocrine: Negative for polydipsia, polyphagia and polyuria.   Genitourinary: Negative for decreased urine volume, dysuria, flank pain, frequency, hematuria and urgency.   Musculoskeletal: Positive for arthralgias and back pain. Negative for neck pain.   Skin: Negative for rash and wound.   Neurological:  Negative for dizziness, speech difficulty, weakness, numbness and headaches.        Personal History     Past Medical History:   Diagnosis Date   • Hypertension      Past Surgical History:   Procedure Laterality Date   • APPENDECTOMY       History reviewed. No pertinent family history.  Social History     Tobacco Use   • Smoking status: Former Smoker     Types: Cigarettes   • Smokeless tobacco: Never Used   Vaping Use   • Vaping Use: Every day   • Substances: Nicotine   Substance Use Topics   • Alcohol use: Yes     Comment: 6-8 beers nightly   • Drug use: Yes     Medications Prior to Admission   Medication Sig Dispense Refill Last Dose   • amLODIPine (NORVASC) 2.5 MG tablet Take 2.5 mg by mouth Daily.   3/13/2021 at Unknown time   • aspirin 81 MG chewable tablet Chew 81 mg Daily.   3/13/2021 at Unknown time     Allergies:  No Known Allergies    Objective    Objective     Vital Signs  Temp:  [97.4 °F (36.3 °C)-98.7 °F (37.1 °C)] 98.7 °F (37.1 °C)  Heart Rate:  [] 102  Resp:  [14-16] 16  BP: (142-170)/(77-84) 170/84  SpO2:  [93 %-98 %] 97 %  on   ;   Device (Oxygen Therapy): room air  Body mass index is 31.6 kg/m².    Physical Exam  Vitals and nursing note reviewed.   Constitutional:       Appearance: Normal appearance.   HENT:      Head: Normocephalic and atraumatic.      Nose: Nose normal.      Mouth/Throat:      Mouth: Mucous membranes are dry.      Pharynx: Oropharynx is clear.   Eyes:      Extraocular Movements: Extraocular movements intact.      Conjunctiva/sclera: Conjunctivae normal.   Cardiovascular:      Rate and Rhythm: Normal rate and regular rhythm.      Pulses: Normal pulses.      Heart sounds: Normal heart sounds.   Pulmonary:      Effort: Pulmonary effort is normal.      Breath sounds: Normal breath sounds.   Abdominal:      General: Bowel sounds are normal. There is no distension.      Palpations: Abdomen is soft.      Tenderness: There is no abdominal tenderness.   Musculoskeletal:          General: No tenderness.      Cervical back: Normal range of motion and neck supple.      Right lower leg: No edema.      Left lower leg: No edema.      Comments: Pain with flexion of right hip   Skin:     General: Skin is warm and dry.   Neurological:      General: No focal deficit present.      Mental Status: He is alert and oriented to person, place, and time.   Psychiatric:         Mood and Affect: Mood normal.         Behavior: Behavior normal.         Results Review:  I reviewed the patient's new clinical results.  I reviewed the patient's new imaging results and agree with the interpretation.  I reviewed the patient's other test results and agree with the interpretation  I personally viewed and interpreted the patient's EKG/Telemetry data  Discussed with ED provider.    Lab Results (last 24 hours)     Procedure Component Value Units Date/Time    CBC & Differential [[id number]]  (Abnormal) Collected: 03/12/21 2153    Specimen: Blood Updated: 03/12/21 2202    Narrative:      The following orders were created for panel order CBC & Differential.  Procedure                               Abnormality         Status                     ---------                               -----------         ------                     CBC Auto Differential[[id number]]        Abnormal            Final result                 Please view results for these tests on the individual orders.    Comprehensive Metabolic Panel [[id number]]  (Abnormal) Collected: 03/12/21 2153    Specimen: Blood Updated: 03/12/21 2222     Glucose 133 mg/dL      BUN 10 mg/dL      Creatinine 0.81 mg/dL      Sodium 143 mmol/L      Potassium 3.9 mmol/L      Chloride 103 mmol/L      CO2 24.8 mmol/L      Calcium 8.9 mg/dL      Total Protein 6.7 g/dL      Albumin 4.50 g/dL      ALT (SGPT) 36 U/L      AST (SGOT) 26 U/L      Alkaline Phosphatase 75 U/L      Total Bilirubin 0.3 mg/dL      eGFR Non African Amer 97 mL/min/1.73      Globulin 2.2 gm/dL      A/G Ratio 2.0 g/dL       BUN/Creatinine Ratio 12.3     Anion Gap 15.2 mmol/L     Narrative:      GFR Normal >60  Chronic Kidney Disease <60  Kidney Failure <15      Ethanol [[id number]]  (Abnormal) Collected: 03/12/21 2153    Specimen: Blood Updated: 03/12/21 2222     Ethanol 177 mg/dL      Ethanol % 0.177 %     CBC Auto Differential [[id number]]  (Abnormal) Collected: 03/12/21 2153    Specimen: Blood Updated: 03/12/21 2202     WBC 9.90 10*3/mm3      RBC 4.40 10*6/mm3      Hemoglobin 14.4 g/dL      Hematocrit 41.7 %      MCV 94.8 fL      MCH 32.7 pg      MCHC 34.5 g/dL      RDW 11.8 %      RDW-SD 40.6 fl      MPV 8.9 fL      Platelets 246 10*3/mm3      Neutrophil % 68.8 %      Lymphocyte % 19.5 %      Monocyte % 6.1 %      Eosinophil % 3.6 %      Basophil % 0.6 %      Immature Grans % 1.4 %      Neutrophils, Absolute 6.81 10*3/mm3      Lymphocytes, Absolute 1.93 10*3/mm3      Monocytes, Absolute 0.60 10*3/mm3      Eosinophils, Absolute 0.36 10*3/mm3      Basophils, Absolute 0.06 10*3/mm3      Immature Grans, Absolute 0.14 10*3/mm3      nRBC 0.0 /100 WBC     COVID PRE-OP / PRE-PROCEDURE SCREENING ORDER (NO ISOLATION) - Swab, Nasopharynx [[id number]] Collected: 03/13/21 0029    Specimen: Swab from Nasopharynx Updated: 03/13/21 0101    Narrative:      The following orders were created for panel order COVID PRE-OP / PRE-PROCEDURE SCREENING ORDER (NO ISOLATION) - Swab, Nasopharynx.  Procedure                               Abnormality         Status                     ---------                               -----------         ------                     COVID-19,APTIMA PANTHER,...[[id number]]                      In process                   Please view results for these tests on the individual orders.    COVID-19,APTIMA PANTHERANGELO IN-HOUSE, NP/OP SWAB IN UTM/VTM/SALINE TRANSPORT MEDIA,24 HR TAT - Swab, Nasopharynx [[id number]] Collected: 03/13/21 0029    Specimen: Swab from Nasopharynx Updated: 03/13/21 0101          Imaging Results (Last 24  Hours)     Procedure Component Value Units Date/Time    CT Cervical Spine Without Contrast [[id number]] Collected: 03/12/21 2331     Updated: 03/12/21 2331    Narrative:        Patient: YANA SQUIRES  Time Out: 23:30  Exam(s): CT C SPINE     EXAM:    CT Cervical Spine Without Intravenous Contrast    CLINICAL HISTORY:     Reason for exam: Neck trauma, intoxicated or obtunded (Age >= 16y).    TECHNIQUE:    Axial computed tomography images of the cervical spine without   intravenous contrast.  CTDI is 19.6 mGy and DLP is 378.00 mGy-cm.  This   CT exam was performed according to the principle of ALARA (As Low As   Reasonably Achievable) by using one or more of the following dose   reduction techniques: automated exposure control, adjustment of the mA   and or kV according to patient size, and or use of iterative   reconstruction technique.    COMPARISON:    No relevant prior studies available.    FINDINGS:    Vertebrae:  No acute fracture or subluxation.  C5 and C6 chronic   fractures.    Discs spinal canal neural foramina:  Multilevel degenerative changes.    Soft tissues:  Unremarkable.    IMPRESSION:         No acute fracture or subluxation.      Impression:          Electronically signed by Moe Harrison M.D. on 03-12-21 at 2330    CT Head Without Contrast [[id number]] Collected: 03/12/21 2320     Updated: 03/12/21 2320    Narrative:        Patient: YANA SQUIRES  Time Out: 23:20  Exam(s): CT HEAD Without Contrast     EXAM:    CT Head Without Intravenous Contrast    CLINICAL HISTORY:     Reason for exam: Head trauma, minor, normal mental status (Age 19-64y).    TECHNIQUE:    Axial computed tomography images of the head brain without intravenous   contrast.  CTDI is 54.8 mGy and DLP is 1073.10 mGy-cm.  This CT exam was   performed according to the principle of ALARA (As Low As Reasonably   Achievable) by using one or more of the following dose reduction   techniques: automated exposure control, adjustment of the  mA and or kV   according to patient size, and or use of iterative reconstruction   technique.    COMPARISON:    None    FINDINGS:    Brain: No acute infarct or hemorrhage. No extra-axial fluid collection.   No mass effect or midline shift.      Ventricles and sulci: Normal. No ventriculomegaly or intraventricular   hemorrhage.      Bones:  Normal. No bony lesion or acute fracture.      Subcutaneous tissues: Right parietal scalp hematoma and laceration.      Sinuses: Mild mucosal thickening in the maxillary sinuses.  Moderate   mucosal thickening in the ethmoid air cells.  Mild mucosal thickening of   the frontal sinuses.      Mastoid air cells: Normal.      Orbits: Grossly unremarkable.      Other: Cerumen in the left external auditory canal.    IMPRESSION:     1.  No acute intracranial abnormality.  2.  Right parietal scalp hematoma and laceration.  No acute fracture.      Impression:          Electronically signed by Yulisa Nur M.D. on 03-12-21 at 2320    XR Hip With or Without Pelvis 2 - 3 View Right [[id number]] Collected: 03/12/21 2309     Updated: 03/12/21 2309    Narrative:        Patient: YANA SQUIRES  Time Out: 23:08  Exam(s): FILM HIP + PELVIS     EXAM:    XR Pelvis, 1 or 2 Views    CLINICAL HISTORY:     Reason for exam: fall with right hip pain.    TECHNIQUE:    Frontal view of the pelvis.    COMPARISON:    None    FINDINGS:    Bones joints: Mildly displaced fractures of the right inferior abdomen   and superior pubic rami.  No hip dislocation.  Osteopenia.  Degenerative   changes of the hips and lower lumbar spine.      Soft tissues: Normal.    IMPRESSION:       Mildly displaced fractures of the right inferior abdomen and superior   pubic rami.      Impression:          Electronically signed by Yulisa Nur M.D. on 03-12-21 at 2308              No orders to display        Assessment/Plan     Active Hospital Problems    Diagnosis  POA   • **Closed fracture of ramus of right pubis (CMS/[facility])  [A47.352V]  Yes   • Hypertension [I10]  Unknown   • ETOH abuse [F10.10]  Unknown       Closed Fracture of Ramus of Right Pubis  -Scalp laceration stapled in the ED. Wound care as needed  -Orthopedic surgery consult  -Neurovascular checks  -PT/OT consults  -PRN Morphine for pain    Hypertension  -Blood pressures stable. Continue home regimen  -Monitor    ETOH Abuse  -Initiate CIWA protocol  -PRN Ativan  -IVF  -Vitamin replacement      -I discussed the patients findings and my recommendations with patient.    VTE Prophylaxis - SCDs.  Code Status - Full code.       AB Salazar  [facility]  03/13/21  04:34 EST

## 2021-03-13 NOTE — PLAN OF CARE
Goal Outcome Evaluation:  Plan of Care Reviewed With: patient     Outcome Summary: Skip is not appropriate for skilled PT at this time. He is functional with bed mobility, transfer, and gait; pain is the limiting factor. Once he can be on a good pain control regimen, he would be good to return home from PT standpoint. Looking into Rwx for home use. Will not  patient on PT caseload at this time. PT wore mask, gloves, and face shield.

## 2021-03-13 NOTE — CONSULTS
Orthopedic Consult      Patient: Arpit Godoy    Date of Admission: 3/12/2021  9:36 PM    YOB: 1960    Medical Record Number: [id number]    Consulting Physician: Deep Mcfadden*    Chief Complaints: Right hip pain    History of Present Illness: 60 y.o. male was admitted and orthopedics was consulted for right hip pain with possible pubic ramus fracture.  Patient states that he fell down stairs yesterday and had difficulty getting up and weightbearing.  States his pain is improved today and he works out with physical therapy.  Locates pain deep in his groin area.  Denies any distal numbness tingling.  Denies any pain in any other extremities.  Does not recall his fall.      Allergies: No Known Allergies    Home Medications:    Current Facility-Administered Medications:   •  acetaminophen (TYLENOL) tablet 650 mg, 650 mg, Oral, Q4H PRN, 650 mg at 03/13/21 0303 **OR** acetaminophen (TYLENOL) 160 MG/5ML solution 650 mg, 650 mg, Oral, Q4H PRN **OR** acetaminophen (TYLENOL) suppository 650 mg, 650 mg, Rectal, Q4H PRN, Deborah Vargas APRN  •  amLODIPine (NORVASC) tablet 2.5 mg, 2.5 mg, Oral, Daily, Deborah Vargas APRN, 2.5 mg at 03/13/21 0856  •  aspirin chewable tablet 81 mg, 81 mg, Oral, Daily, Deborah Vargas APRN, 81 mg at 03/13/21 0856  •  bisacodyl (DULCOLAX) EC tablet 5 mg, 5 mg, Oral, Daily PRN, Deborah Vargas APRN  •  calcium carbonate (TUMS) chewable tablet 500 mg (200 mg elemental), 2 tablet, Oral, BID PRN, Deborah Vargas APRN  •  [START ON 3/14/2021] thiamine (VITAMIN B-1) tablet 100 mg, 100 mg, Oral, Daily **AND** [START ON 3/14/2021] multivitamin (THERAGRAN) tablet 1 tablet, 1 tablet, Oral, Daily **AND** [START ON 3/14/2021] folic acid (FOLVITE) tablet 1 mg, 1 mg, Oral, Daily, Deborah Vargas APRN  •  HYDROcodone-acetaminophen (NORCO) 5-325 MG per tablet 1 tablet, 1 tablet, Oral, Q6H PRN, Deep Mcfadden MD, 1 tablet at  03/13/21 0856  •  [START ON 3/14/2021] influenza vac split quad (FLUZONE,FLUARIX,AFLURIA,FLULAVAL) injection 0.5 mL, 0.5 mL, Intramuscular, Once, Scooter Villagomez MD  •  LORazepam (ATIVAN) tablet 1 mg, 1 mg, Oral, Q2H PRN **OR** LORazepam (ATIVAN) injection 1 mg, 1 mg, Intravenous, Q2H PRN **OR** LORazepam (ATIVAN) tablet 2 mg, 2 mg, Oral, Q1H PRN **OR** LORazepam (ATIVAN) injection 2 mg, 2 mg, Intravenous, Q1H PRN **OR** LORazepam (ATIVAN) injection 2 mg, 2 mg, Intravenous, Q15 Min PRN **OR** LORazepam (ATIVAN) injection 2 mg, 2 mg, Intramuscular, Q15 Min PRN, Deborah Vargas APRN  •  morphine injection 2 mg, 2 mg, Intravenous, Q3H PRN, Deborah Vargas APRN, 2 mg at 03/13/21 0302  •  ondansetron (ZOFRAN) tablet 4 mg, 4 mg, Oral, Q6H PRN, 4 mg at 03/13/21 0302 **OR** ondansetron (ZOFRAN) injection 4 mg, 4 mg, Intravenous, Q6H PRN, Deborah Vargas APRN  •  [COMPLETED] Insert peripheral IV, , , Once **AND** sodium chloride 0.9 % flush 10 mL, 10 mL, Intravenous, PRN, Ricardo Hollins MD, 10 mL at 03/13/21 0020  •  sodium chloride 0.9 % flush 10 mL, 10 mL, Intravenous, Q12H, Deborah Vargas APRN, 10 mL at 03/13/21 0856  •  sodium chloride 0.9 % flush 10 mL, 10 mL, Intravenous, PRN, Deborah Vargas APRN    Current Medications:  Scheduled Meds:amLODIPine, 2.5 mg, Oral, Daily  aspirin, 81 mg, Oral, Daily  [START ON 3/14/2021] thiamine, 100 mg, Oral, Daily   And  [START ON 3/14/2021] multivitamin, 1 tablet, Oral, Daily   And  [START ON 3/14/2021] folic acid, 1 mg, Oral, Daily  [START ON 3/14/2021] influenza vaccine, 0.5 mL, Intramuscular, Once  sodium chloride, 10 mL, Intravenous, Q12H      Continuous Infusions:   PRN Meds:.•  acetaminophen **OR** acetaminophen **OR** acetaminophen  •  bisacodyl  •  calcium carbonate  •  HYDROcodone-acetaminophen  •  LORazepam **OR** LORazepam **OR** LORazepam **OR** LORazepam **OR** LORazepam **OR** LORazepam  •  Morphine  •  ondansetron **OR**  ondansetron  •  [COMPLETED] Insert peripheral IV **AND** sodium chloride  •  sodium chloride    Past Medical History:   Diagnosis Date   • Hypertension        Past Surgical History:   Procedure Laterality Date   • APPENDECTOMY         Social History     Occupational History   • Not on file   Tobacco Use   • Smoking status: Former Smoker     Types: Cigarettes   • Smokeless tobacco: Never Used   Vaping Use   • Vaping Use: Every day   • Substances: Nicotine   Substance and Sexual Activity   • Alcohol use: Yes     Comment: 6-8 beers nightly   • Drug use: Yes   • Sexual activity: Defer      Social History     Social History Narrative   • Not on file       History reviewed. No pertinent family history.    Review of Systems:     Constitutional:  Denies fever, shaking or chills   Eyes:  Denies change in visual acuity   HEENT:  Denies nasal congestion or sore throat   Respiratory:  Denies cough or shortness of breath   Cardiovascular:  Denies chest pain or edema  Endocrine: Denies tremors, palpitations, intolerance of heat or cold, polyuria, polydipsia.  GI:  Denies abdominal pain, nausea, vomiting, bloody stools or diarrhea  :  Denies frequency, urgency, incontinence, retention, or nocturia.  Musculoskeletal:  Denies numbness tingling or loss of motor function except as above  Integument:  Denies rash, lesion or ulceration   Neurologic:  Denies headache or focal weakness, deficits  Heme:  Denies epistaxis, spontaneous or excessive bleeding, hematuria, melena, fatigue, enlarged or tender lymph nodes.      All other pertinent positives and negatives as noted above in HPI.    Physical Exam: 60 y.o. male    Vitals:    03/13/21 0024 03/13/21 0204 03/13/21 0655 03/13/21 1046   BP: 142/77 170/84 167/79 (!) 182/90   BP Location:  Right arm Left arm Left arm   Patient Position:  Lying Lying Lying   Pulse: 101 102 95 87   Resp:  16 18 18   Temp:  98.7 °F (37.1 °C) 97.7 °F (36.5 °C) 97.3 °F (36.3 °C)   TempSrc:  Skin Skin Skin      SpO2: 93% 97% 95% 95%   Weight:  99.9 kg (220 lb 3.8 oz)     Height:         General:  Awake, alert. No acute distress.      Head/Neck:  Normocephalic, atraumatic.  Conjunctiva and sclera clear.  Hearing adequate for the exam.  Neck is supple with normal ROM.    Psych:  Affect and demeanor appropriate.    CV:  Regular rate and rhythm.  Hemodynamically stable.    Lungs:  Good chest expansion, breathing unlabored.    Abdomen:  Soft.  Non-tender, non-distended.    Extremities: Right lower extremity:  Skin appears benign without obvious lacerations, ulcerations or lesions.  No gross deformity of malalignment noted.  Compartments soft without evidence for DVT or compartment syndrome.  No atrophy.  No palpable masses or adenopathy. ROM limited due to pain.   No obvious instability although exam is limited due to discomfort.  Strength well-preserved distally.  Sensation to light touch grossly intact distally.  Good skin turgor, brisk cap refill and good pulses distally.    All other extremities atraumatic without gross abnormality.     Diagnostic Tests:    Admission on 03/12/2021   Component Date Value Ref Range Status   • Glucose 03/12/2021 133* 65 - 99 mg/dL Final   • BUN 03/12/2021 10  8 - 23 mg/dL Final   • Creatinine 03/12/2021 0.81  0.76 - 1.27 mg/dL Final   • Sodium 03/12/2021 143  136 - 145 mmol/L Final   • Potassium 03/12/2021 3.9  3.5 - 5.2 mmol/L Final   • Chloride 03/12/2021 103  98 - 107 mmol/L Final   • CO2 03/12/2021 24.8  22.0 - 29.0 mmol/L Final   • Calcium 03/12/2021 8.9  8.6 - 10.5 mg/dL Final   • Total Protein 03/12/2021 6.7  6.0 - 8.5 g/dL Final   • Albumin 03/12/2021 4.50  3.50 - 5.20 g/dL Final   • ALT (SGPT) 03/12/2021 36  1 - 41 U/L Final   • AST (SGOT) 03/12/2021 26  1 - 40 U/L Final   • Alkaline Phosphatase 03/12/2021 75  39 - 117 U/L Final   • Total Bilirubin 03/12/2021 0.3  0.0 - 1.2 mg/dL Final   • eGFR Non African Amer 03/12/2021 97  >60 mL/min/1.73 Final   • Globulin 03/12/2021 2.2  gm/dL  Final   • A/G Ratio 03/12/2021 2.0  g/dL Final   • BUN/Creatinine Ratio 03/12/2021 12.3  7.0 - 25.0 Final   • Anion Gap 03/12/2021 15.2* 5.0 - 15.0 mmol/L Final   • Ethanol 03/12/2021 177* 0 - 10 mg/dL Final   • Ethanol % 03/12/2021 0.177  % Final   • WBC 03/12/2021 9.90  3.40 - 10.80 10*3/mm3 Final   • RBC 03/12/2021 4.40  4.14 - 5.80 10*6/mm3 Final   • Hemoglobin 03/12/2021 14.4  13.0 - 17.7 g/dL Final   • Hematocrit 03/12/2021 41.7  37.5 - 51.0 % Final   • MCV 03/12/2021 94.8  79.0 - 97.0 fL Final   • MCH 03/12/2021 32.7  26.6 - 33.0 pg Final   • MCHC 03/12/2021 34.5  31.5 - 35.7 g/dL Final   • RDW 03/12/2021 11.8* 12.3 - 15.4 % Final   • RDW-SD 03/12/2021 40.6  37.0 - 54.0 fl Final   • MPV 03/12/2021 8.9  6.0 - 12.0 fL Final   • Platelets 03/12/2021 246  140 - 450 10*3/mm3 Final   • Neutrophil % 03/12/2021 68.8  42.7 - 76.0 % Final   • Lymphocyte % 03/12/2021 19.5* 19.6 - 45.3 % Final   • Monocyte % 03/12/2021 6.1  5.0 - 12.0 % Final   • Eosinophil % 03/12/2021 3.6  0.3 - 6.2 % Final   • Basophil % 03/12/2021 0.6  0.0 - 1.5 % Final   • Immature Grans % 03/12/2021 1.4* 0.0 - 0.5 % Final   • Neutrophils, Absolute 03/12/2021 6.81  1.70 - 7.00 10*3/mm3 Final   • Lymphocytes, Absolute 03/12/2021 1.93  0.70 - 3.10 10*3/mm3 Final   • Monocytes, Absolute 03/12/2021 0.60  0.10 - 0.90 10*3/mm3 Final   • Eosinophils, Absolute 03/12/2021 0.36  0.00 - 0.40 10*3/mm3 Final   • Basophils, Absolute 03/12/2021 0.06  0.00 - 0.20 10*3/mm3 Final   • Immature Grans, Absolute 03/12/2021 0.14* 0.00 - 0.05 10*3/mm3 Final   • nRBC 03/12/2021 0.0  0.0 - 0.2 /100 WBC Final   • COVID19 03/13/2021 Not Detected  Not Detected - Ref. Range Final   • Glucose 03/13/2021 117* 65 - 99 mg/dL Final   • BUN 03/13/2021 11  8 - 23 mg/dL Final   • Creatinine 03/13/2021 0.73* 0.76 - 1.27 mg/dL Final   • Sodium 03/13/2021 141  136 - 145 mmol/L Final   • Potassium 03/13/2021 4.1  3.5 - 5.2 mmol/L Final   • Chloride 03/13/2021 104  98 - 107 mmol/L Final   •  CO2 03/13/2021 25.5  22.0 - 29.0 mmol/L Final   • Calcium 03/13/2021 8.6  8.6 - 10.5 mg/dL Final   • eGFR Non African Amer 03/13/2021 109  >60 mL/min/1.73 Final   • BUN/Creatinine Ratio 03/13/2021 15.1  7.0 - 25.0 Final   • Anion Gap 03/13/2021 11.5  5.0 - 15.0 mmol/L Final   • WBC 03/13/2021 13.41* 3.40 - 10.80 10*3/mm3 Final   • RBC 03/13/2021 4.16  4.14 - 5.80 10*6/mm3 Final   • Hemoglobin 03/13/2021 13.2  13.0 - 17.7 g/dL Final   • Hematocrit 03/13/2021 37.9  37.5 - 51.0 % Final   • MCV 03/13/2021 91.1  79.0 - 97.0 fL Final   • MCH 03/13/2021 31.7  26.6 - 33.0 pg Final   • MCHC 03/13/2021 34.8  31.5 - 35.7 g/dL Final   • RDW 03/13/2021 11.7* 12.3 - 15.4 % Final   • RDW-SD 03/13/2021 38.7  37.0 - 54.0 fl Final   • MPV 03/13/2021 9.0  6.0 - 12.0 fL Final   • Platelets 03/13/2021 236  140 - 450 10*3/mm3 Final     Lab Results (last 24 hours)     Procedure Component Value Units Date/Time    COVID PRE-OP / PRE-PROCEDURE SCREENING ORDER (NO ISOLATION) - Swab, Nasopharynx [[id number]]  (Normal) Collected: 03/13/21 0029    Specimen: Swab from Nasopharynx Updated: 03/13/21 1039    Narrative:      The following orders were created for panel order COVID PRE-OP / PRE-PROCEDURE SCREENING ORDER (NO ISOLATION) - Swab, Nasopharynx.  Procedure                               Abnormality         Status                     ---------                               -----------         ------                     COVID-19,APTIMA PANTHER,...[[id number]]  Normal              Final result                 Please view results for these tests on the individual orders.    COVID-19,[facility]TIM[facility] IN-HOUSE, NP/OP SWAB IN UTM/VTM/[facility] TRANSPORT MEDIA,24 HR TAT - Swab, Nasopharynx [[id number]]  (Normal) Collected: 03/13/21 0029    Specimen: Swab from Nasopharynx Updated: 03/13/21 1039     COVID19 Not Detected    Narrative:      Fact sheet for providers: https://www.fda.gov/media/050604/download     Fact sheet for patients:  https://www.fda.gov/media/864504/download    Test performed by RT PCR.    Basic Metabolic Panel [[id number]]  (Abnormal) Collected: 03/13/21 0454    Specimen: Blood Updated: 03/13/21 0603     Glucose 117 mg/dL      BUN 11 mg/dL      Creatinine 0.73 mg/dL      Sodium 141 mmol/L      Potassium 4.1 mmol/L      Chloride 104 mmol/L      CO2 25.5 mmol/L      Calcium 8.6 mg/dL      eGFR Non African Amer 109 mL/min/1.73      BUN/Creatinine Ratio 15.1     Anion Gap 11.5 mmol/L     Narrative:      GFR Normal >60  Chronic Kidney Disease <60  Kidney Failure <15      CBC (No Diff) [[id number]]  (Abnormal) Collected: 03/13/21 0454    Specimen: Blood Updated: 03/13/21 0553     WBC 13.41 10*3/mm3      RBC 4.16 10*6/mm3      Hemoglobin 13.2 g/dL      Hematocrit 37.9 %      MCV 91.1 fL      MCH 31.7 pg      MCHC 34.8 g/dL      RDW 11.7 %      RDW-SD 38.7 fl      MPV 9.0 fL      Platelets 236 10*3/mm3     Comprehensive Metabolic Panel [[id number]]  (Abnormal) Collected: 03/12/21 2153    Specimen: Blood Updated: 03/12/21 2222     Glucose 133 mg/dL      BUN 10 mg/dL      Creatinine 0.81 mg/dL      Sodium 143 mmol/L      Potassium 3.9 mmol/L      Chloride 103 mmol/L      CO2 24.8 mmol/L      Calcium 8.9 mg/dL      Total Protein 6.7 g/dL      Albumin 4.50 g/dL      ALT (SGPT) 36 U/L      AST (SGOT) 26 U/L      Alkaline Phosphatase 75 U/L      Total Bilirubin 0.3 mg/dL      eGFR Non African Amer 97 mL/min/1.73      Globulin 2.2 gm/dL      A/G Ratio 2.0 g/dL      BUN/Creatinine Ratio 12.3     Anion Gap 15.2 mmol/L     Narrative:      GFR Normal >60  Chronic Kidney Disease <60  Kidney Failure <15      Ethanol [[id number]]  (Abnormal) Collected: 03/12/21 2153    Specimen: Blood Updated: 03/12/21 2222     Ethanol 177 mg/dL      Ethanol % 0.177 %     CBC & Differential [[id number]]  (Abnormal) Collected: 03/12/21 2153    Specimen: Blood Updated: 03/12/21 2202    Narrative:      The following orders were created for panel order CBC &  Differential.  Procedure                               Abnormality         Status                     ---------                               -----------         ------                     CBC Auto Differential[[id number]]        Abnormal            Final result                 Please view results for these tests on the individual orders.    CBC Auto Differential [[id number]]  (Abnormal) Collected: 03/12/21 2153    Specimen: Blood Updated: 03/12/21 2202     WBC 9.90 10*3/mm3      RBC 4.40 10*6/mm3      Hemoglobin 14.4 g/dL      Hematocrit 41.7 %      MCV 94.8 fL      MCH 32.7 pg      MCHC 34.5 g/dL      RDW 11.8 %      RDW-SD 40.6 fl      MPV 8.9 fL      Platelets 246 10*3/mm3      Neutrophil % 68.8 %      Lymphocyte % 19.5 %      Monocyte % 6.1 %      Eosinophil % 3.6 %      Basophil % 0.6 %      Immature Grans % 1.4 %      Neutrophils, Absolute 6.81 10*3/mm3      Lymphocytes, Absolute 1.93 10*3/mm3      Monocytes, Absolute 0.60 10*3/mm3      Eosinophils, Absolute 0.36 10*3/mm3      Basophils, Absolute 0.06 10*3/mm3      Immature Grans, Absolute 0.14 10*3/mm3      nRBC 0.0 /100 WBC           Imaging: 3 views of the right hip reviewed and do show superior and inferior pubic rami fractures.    Assessment: Right superior and inferior pubic rami fractures    Plan:      Plan to order a CT to verify that there is no fracture extension into the acetabulum or involving his hip joint.  X-rays were also ordered.  If imaging confirms no fracture extension and only pubic ramus fractures we will plan nonoperative management with protected weightbearing with a walker.    Plan to follow-up with imaging and will leave final recommendations once reviewed.    Please call with any questions or concerns thank you    Date: 3/13/2021    Berry Jacome MD    CC: Deep Mcfadden*

## 2021-03-13 NOTE — DISCHARGE SUMMARY
Date of Admission: 3/12/2021  Date of Discharge:  3/13/2021  Primary Care Physician: Provider, No Known     Discharge Diagnosis:  Active Hospital Problems    Diagnosis  POA   • **Closed fracture of ramus of right pubis (CMS/[facility]) [S32.591A]  Yes   • Hypertension [I10]  Unknown   • ETOH abuse [F10.10]  Unknown      Resolved Hospital Problems   No resolved problems to display.       DETAILS OF HOSPITAL STAY     Pertinent Test Results and Procedures Performed    CT C-spine:    No acute fracture or subluxation.     Head CT:  1.  No acute intracranial abnormality.   2.  Right parietal scalp hematoma and laceration.  No acute fracture.     CT Pelvis:  Acute fractures of the right superior and inferior pubic   rami as described. No pelvic hematomas are evident. There is a moderate   amount of stool distending the rectum suggesting an element of fecal   impaction. Solitary small simple right renal cyst. Otherwise   unremarkable CT imaging of the abdomen and pelvis.     Pelvic xray:  Stable appearing right inferior and superior pubic rami fractures are   noted. No new fractures are identified. No dislocation. Hip joint spaces   appear preserved. Degenerative changes are seen in cirrhosis pubis and   partly included lumbar spine. Abundant stool in the rectum, correlate   clinically to exclude any possibility of stool impaction.     Hospital Course  This is a 60-year-old male who suffers from alcohol abuse and has a history of hypertension.  He presented to the emergency room after suffering a mechanical fall resulting in right pubic rami fractures. He was admitted and pain was controlled with oral medication.  Orthopedic surgery was consulted and Pelvic CT obtained as described above.  He was cleared for discharge by ortho and PT with use of a walker at home.  I have advised him to stop drinking.  He will be discharged today and will follow up with his PCP and ortho as an outpatient.    Physical Exam at Discharge:  General:  No acute distress, AAOx3  HEENT: EOMI, PERRL  Cardiovascular: +s1 and s2, RRR  Lungs: No rhonchi or wheezing  Abdomen: soft, nontender    Consults:   Consults     Date and Time Order Name Status Description    3/13/2021  1:31 AM Inpatient Orthopedic Surgery Consult Completed     3/13/2021 12:07 AM [facility] (on-call MD unless specified) Details Completed             Condition on Discharge: Stable    Discharge Disposition  Home or Self Care    Discharge Medications     Discharge Medications      New Medications      Instructions Start Date   docusate sodium 100 MG capsule  Commonly known as: Colace   100 mg, Oral, 2 Times Daily      HYDROcodone-acetaminophen 7.5-325 MG per tablet  Commonly known as: NORCO   1 tablet, Oral, Every 4 Hours PRN         Continue These Medications      Instructions Start Date   amLODIPine 2.5 MG tablet  Commonly known as: NORVASC   2.5 mg, Oral, Daily      aspirin 81 MG chewable tablet   81 mg, Oral, Daily             Discharge Diet:   Diet Instructions     Diet: Regular      Discharge Diet: Regular          Activity at Discharge:   Activity Instructions     Activity as Tolerated            Follow-up Appointments  Future Appointments   Date Time Provider Department Center   6/10/2021  2:30 PM Moe Haney MD [id number]     Additional Instructions for the Follow-ups that You Need to Schedule     Discharge Follow-up with PCP   As directed       Currently Documented PCP:    Provider, No Known    PCP Phone Number:    [phone]     Follow Up Details: 1 week         Discharge Follow-up with Specialty: Orthopedics per their recommendations   As directed      Specialty: Orthopedics per their recommendations               I have examined and discussed discharge planning with the patient today.    I wore full protective equipment throughout the patient encounter including eye protection and facemask.  Hand hygiene was performed before donning protective equipment and after removal when leaving  the room.     Deep Mcfadden MD  03/13/21  16:47 EST    Time: Discharge greater than 30 min

## 2021-03-13 NOTE — ED PROVIDER NOTES
EMERGENCY DEPARTMENT ENCOUNTER  Patient was placed in face mask in first look and the following protective measures were taken unless additional measures were taken and documented below in the ED course. Patient was wearing facemask when I entered the room and throughout our encounter. I wore full protective equipment throughout this patient encounter including a face mask, and gloves. Hand hygiene was performed before donning protective equipment and after removal when leaving the room.    Room Number:  P891/1  Date of encounter:  3/13/2021  PCP: Provider, No Known    HPI:  Context: Arpit Godoy is a 60 y.o. male who presents to the ED c/o chief complaint of 3 after a fall.  Patient admits to drinking at least 6 beers today, which he states he does not was every day.  He states he does not recall falling but was reportedly was found snoring at the bottom of carpeted stairs.  Onto a carpeted floor.  Family assumes that patient fell down flight of stairs.  Patient complains of right inguinal pain which he states is pain that he gets from time to time.  He has been told that this is referred pain from his lumbar spine.  He also has a laceration to the back of his head.  He denies headache, neck pain, chest pain or belly pain.  When EMS arrived they found patient sitting in a chair.  EMS did not place a cervical collar on patient en route to the hospital.  She does not recall his last tetanus shot.    MEDICAL HISTORY REVIEW  Reviewed in EPIC    PAST MEDICAL HISTORY  Active Ambulatory Problems     Diagnosis Date Noted   • No Active Ambulatory Problems     Resolved Ambulatory Problems     Diagnosis Date Noted   • No Resolved Ambulatory Problems     Past Medical History:   Diagnosis Date   • Hypertension        PAST SURGICAL HISTORY  Past Surgical History:   Procedure Laterality Date   • APPENDECTOMY         FAMILY HISTORY  History reviewed. No pertinent family history.    SOCIAL HISTORY  Social History      Socioeconomic History   • Marital status:      Spouse name: Not on file   • Number of children: Not on file   • Years of education: Not on file   • Highest education level: Not on file   Tobacco Use   • Smoking status: Current Every Day Smoker     Types: Cigarettes   • Smokeless tobacco: Never Used   Substance and Sexual Activity   • Alcohol use: Yes   • Drug use: Never       ALLERGIES  Patient has no known allergies.    The patient's allergies have been reviewed    REVIEW OF SYSTEMS  All systems reviewed and negative except for those discussed in HPI.     PHYSICAL EXAM  I have reviewed the triage vital signs and nursing notes.  ED Triage Vitals [03/12/21 2135]   Temp Heart Rate Resp BP SpO2   97.4 °F (36.3 °C) 96 14 148/78 98 %      Temp src Heart Rate Source Patient Position BP Location FiO2 (%)   Tympanic Monitor -- -- --       General: Mild distress  HENT: NC, PERRL, Nares patent.  There is an approximate 3 cm curvilinear laceration on the occipital scalp with minimal bleeding.  It is mildly tender to palpation.  Eyes: no scleral icterus, extraocular movements are intact  Neck: trachea midline, no ROM limitations.  C-spine is nontender to palpation  CV: regular rhythm, regular rate  Respiratory: normal effort, CTAB  Abdomen: soft, nondistended, nontender to palpation, no rebound tenderness, no guarding or rigidity  : deferred  Musculoskeletal: no deformity.  His right hip is nontender to palpation however, patient has pain with flexion and internal rotation of his right hip.  His left hip is nontender to palpation.  His arms are nontender to palpation.  Neuro: alert, moves all extremities, follows commands  Skin: warm, dry    LAB RESULTS  Recent Results (from the past 24 hour(s))   Comprehensive Metabolic Panel    Collection Time: 03/12/21  9:53 PM    Specimen: Blood   Result Value Ref Range    Glucose 133 (H) 65 - 99 mg/dL    BUN 10 8 - 23 mg/dL    Creatinine 0.81 0.76 - 1.27 mg/dL    Sodium 143  136 - 145 mmol/L    Potassium 3.9 3.5 - 5.2 mmol/L    Chloride 103 98 - 107 mmol/L    CO2 24.8 22.0 - 29.0 mmol/L    Calcium 8.9 8.6 - 10.5 mg/dL    Total Protein 6.7 6.0 - 8.5 g/dL    Albumin 4.50 3.50 - 5.20 g/dL    ALT (SGPT) 36 1 - 41 U/L    AST (SGOT) 26 1 - 40 U/L    Alkaline Phosphatase 75 39 - 117 U/L    Total Bilirubin 0.3 0.0 - 1.2 mg/dL    eGFR Non African Amer 97 >60 mL/min/1.73    Globulin 2.2 gm/dL    A/G Ratio 2.0 g/dL    BUN/Creatinine Ratio 12.3 7.0 - 25.0    Anion Gap 15.2 (H) 5.0 - 15.0 mmol/L   Ethanol    Collection Time: 03/12/21  9:53 PM    Specimen: Blood   Result Value Ref Range    Ethanol 177 (H) 0 - 10 mg/dL    Ethanol % 0.177 %   CBC Auto Differential    Collection Time: 03/12/21  9:53 PM    Specimen: Blood   Result Value Ref Range    WBC 9.90 3.40 - 10.80 10*3/mm3    RBC 4.40 4.14 - 5.80 10*6/mm3    Hemoglobin 14.4 13.0 - 17.7 g/dL    Hematocrit 41.7 37.5 - 51.0 %    MCV 94.8 79.0 - 97.0 fL    MCH 32.7 26.6 - 33.0 pg    MCHC 34.5 31.5 - 35.7 g/dL    RDW 11.8 (L) 12.3 - 15.4 %    RDW-SD 40.6 37.0 - 54.0 fl    MPV 8.9 6.0 - 12.0 fL    Platelets 246 140 - 450 10*3/mm3    Neutrophil % 68.8 42.7 - 76.0 %    Lymphocyte % 19.5 (L) 19.6 - 45.3 %    Monocyte % 6.1 5.0 - 12.0 %    Eosinophil % 3.6 0.3 - 6.2 %    Basophil % 0.6 0.0 - 1.5 %    Immature Grans % 1.4 (H) 0.0 - 0.5 %    Neutrophils, Absolute 6.81 1.70 - 7.00 10*3/mm3    Lymphocytes, Absolute 1.93 0.70 - 3.10 10*3/mm3    Monocytes, Absolute 0.60 0.10 - 0.90 10*3/mm3    Eosinophils, Absolute 0.36 0.00 - 0.40 10*3/mm3    Basophils, Absolute 0.06 0.00 - 0.20 10*3/mm3    Immature Grans, Absolute 0.14 (H) 0.00 - 0.05 10*3/mm3    nRBC 0.0 0.0 - 0.2 /100 WBC       I ordered the above labs and reviewed the results.    RADIOLOGY  CT Head Without Contrast    Result Date: 3/12/2021  Patient: YANA SQUIRES  Time Out: 23:20 Exam(s): CT HEAD Without Contrast EXAM:   CT Head Without Intravenous Contrast CLINICAL HISTORY:    Reason for exam: Head  trauma, minor, normal mental status (Age 19-64y). TECHNIQUE:   Axial computed tomography images of the head brain without intravenous contrast.  CTDI is 54.8 mGy and DLP is 1073.10 mGy-cm.  This CT exam was performed according to the principle of ALARA (As Low As Reasonably Achievable) by using one or more of the following dose reduction techniques: automated exposure control, adjustment of the mA and or kV according to patient size, and or use of iterative reconstruction technique. COMPARISON:   None FINDINGS:   Brain: No acute infarct or hemorrhage. No extra-axial fluid collection. No mass effect or midline shift.   Ventricles and sulci: Normal. No ventriculomegaly or intraventricular hemorrhage.   Bones:  Normal. No bony lesion or acute fracture.   Subcutaneous tissues: Right parietal scalp hematoma and laceration.   Sinuses: Mild mucosal thickening in the maxillary sinuses.  Moderate mucosal thickening in the ethmoid air cells.  Mild mucosal thickening of the frontal sinuses.   Mastoid air cells: Normal.   Orbits: Grossly unremarkable.   Other: Cerumen in the left external auditory canal. IMPRESSION:   1.  No acute intracranial abnormality. 2.  Right parietal scalp hematoma and laceration.  No acute fracture.     Electronically signed by Yulisa Nur M.D. on 03-12-21 at 2320    CT Cervical Spine Without Contrast    Result Date: 3/12/2021  Patient: YANA SQUIRES  Time Out: 23:30 Exam(s): CT C SPINE EXAM:   CT Cervical Spine Without Intravenous Contrast CLINICAL HISTORY:    Reason for exam: Neck trauma, intoxicated or obtunded (Age >= 16y). TECHNIQUE:   Axial computed tomography images of the cervical spine without intravenous contrast.  CTDI is 19.6 mGy and DLP is 378.00 mGy-cm.  This CT exam was performed according to the principle of ALARA (As Low As Reasonably Achievable) by using one or more of the following dose reduction techniques: automated exposure control, adjustment of the mA and or kV according to  patient size, and or use of iterative reconstruction technique. COMPARISON:   No relevant prior studies available. FINDINGS:   Vertebrae:  No acute fracture or subluxation.  C5 and C6 chronic fractures.   Discs spinal canal neural foramina:  Multilevel degenerative changes.   Soft tissues:  Unremarkable. IMPRESSION:       No acute fracture or subluxation.     Electronically signed by Moe Harrison M.D. on 03-12-21 at 2330    XR Hip With or Without Pelvis 2 - 3 View Right    Result Date: 3/12/2021  Patient: YANA SQUIRES  Time Out: 23:08 Exam(s): FILM HIP + PELVIS EXAM:   XR Pelvis, 1 or 2 Views CLINICAL HISTORY:    Reason for exam: fall with right hip pain. TECHNIQUE:   Frontal view of the pelvis. COMPARISON:   None FINDINGS:   Bones joints: Mildly displaced fractures of the right inferior abdomen and superior pubic rami.  No hip dislocation.  Osteopenia.  Degenerative changes of the hips and lower lumbar spine.   Soft tissues: Normal. IMPRESSION:     Mildly displaced fractures of the right inferior abdomen and superior pubic rami.     Electronically signed by Yulisa Nur M.D. on 03-12-21 at 2308      I ordered the above noted radiological studies. I reviewed the images and results. I agree with the radiologist interpretation.    PROCEDURES  Procedures    MEDICATIONS GIVEN IN ER  Medications   sodium chloride 0.9 % flush 10 mL (10 mL Intravenous Given 3/13/21 0020)   sodium chloride 0.9 % flush 10 mL (has no administration in time range)   sodium chloride 0.9 % flush 10 mL (has no administration in time range)   acetaminophen (TYLENOL) tablet 650 mg (has no administration in time range)     Or   acetaminophen (TYLENOL) 160 MG/5ML solution 650 mg (has no administration in time range)     Or   acetaminophen (TYLENOL) suppository 650 mg (has no administration in time range)   bisacodyl (DULCOLAX) EC tablet 5 mg (has no administration in time range)   ondansetron (ZOFRAN) tablet 4 mg (has no administration in time  range)     Or   ondansetron (ZOFRAN) injection 4 mg (has no administration in time range)   calcium carbonate (TUMS) chewable tablet 500 mg (200 mg elemental) (has no administration in time range)   morphine injection 2 mg (has no administration in time range)   thiamine (B-1) 100 mg in sodium chloride 0.9 % 100 mL IVPB (has no administration in time range)     Or   thiamine (VITAMIN B-1) tablet 100 mg (has no administration in time range)   thiamine (VITAMIN B-1) tablet 100 mg (has no administration in time range)     And   multivitamin (THERAGRAN) tablet 1 tablet (has no administration in time range)     And   folic acid (FOLVITE) tablet 1 mg (has no administration in time range)   LORazepam (ATIVAN) tablet 1 mg (has no administration in time range)     Or   LORazepam (ATIVAN) injection 1 mg (has no administration in time range)     Or   LORazepam (ATIVAN) tablet 2 mg (has no administration in time range)     Or   LORazepam (ATIVAN) injection 2 mg (has no administration in time range)     Or   LORazepam (ATIVAN) injection 2 mg (has no administration in time range)     Or   LORazepam (ATIVAN) injection 2 mg (has no administration in time range)   lidocaine PF 1% (XYLOCAINE) injection 10 mL (10 mL Other Given 3/12/21 2155)   Tdap (BOOSTRIX) injection 0.5 mL (0.5 mL Intramuscular Given 3/12/21 2219)   morphine injection 4 mg (4 mg Intravenous Given 3/13/21 0020)   ondansetron (ZOFRAN) injection 4 mg (4 mg Intravenous Given 3/13/21 0019)       PROGRESS, DATA ANALYSIS, CONSULTS, AND MEDICAL DECISION MAKING  A complete history and physical exam have been performed.  All available laboratory and imaging results have been reviewed by myself prior to disposition.    MDM  After the initial H&P, I discussed pertinent information from history and physical exam with patient/family.  Discussed differential diagnosis.  Discussed plan for ED evaluation/work-up/treatment.  All questions answered.  Patient/family is agreeable with  plan.  ED Course as of Mar 13 0226   Fri Mar 12, 2021   2332 Patient has a right superior and inferior pubic ramus fracture.    [DE]   Sat Mar 13, 2021   0007 Patient try to ambulate with a walker and was unable to do so.  Complains of pain to his right hip.  We will consult medicine in the to the hospital.  Patient states he currently does not have a physician but is signed up to see Dr. Haney in the near future.    [DE]   0022 I discussed the case with BRENNON Cates for Dr. Villagomez with [facility].  They are admit patient to the hospital for further evaluation and treatment.    [DE]      ED Course User Index  [DE] Ricardo Hollins MD       AS OF 02:26 EST VITALS:    BP - 170/84  HR - 102  TEMP - 98.7 °F (37.1 °C) (Skin)  O2 SATS - 97%    DIAGNOSIS  Final diagnoses:   Closed fracture of ramus of right pubis, initial encounter (CMS/HCC)   Laceration of scalp, initial encounter   Alcoholic intoxication with complication (CMS/HCC)   Fall down stairs, initial encounter         DISPOSITION  ADMISSION    Discussed treatment plan and reason for admission with pt/family and admitting physician.  Pt/family voiced understanding of the plan for admission for further testing/treatment as needed.           Ricardo Hollins MD  03/13/21 0226

## 2021-03-16 NOTE — PROGRESS NOTES
Case Management Discharge Note      Final Note: Home with family/friend support.         Selected Continued Care - Discharged on 3/13/2021 Admission date: 3/12/2021 - Discharge disposition: Home or Self Care    Destination    No services have been selected for the patient.              Durable Medical Equipment    No services have been selected for the patient.              Dialysis/Infusion    No services have been selected for the patient.              Home Medical Care    No services have been selected for the patient.              Therapy    No services have been selected for the patient.              Community Resources    No services have been selected for the patient.                       Final Discharge Disposition Code: 01 - home or self-care

## 2021-03-29 ENCOUNTER — OFFICE VISIT (OUTPATIENT)
Dept: ORTHOPEDIC SURGERY | Facility: CLINIC | Age: 61
End: 2021-03-29

## 2021-03-29 VITALS — HEIGHT: 70 IN | BODY MASS INDEX: 30.06 KG/M2 | TEMPERATURE: 96.4 F | WEIGHT: 210 LBS

## 2021-03-29 DIAGNOSIS — S32.591A CLOSED FRACTURE OF RAMUS OF RIGHT PUBIS, INITIAL ENCOUNTER (HCC): Primary | ICD-10-CM

## 2021-03-29 PROCEDURE — 72190 X-RAY EXAM OF PELVIS: CPT | Performed by: ORTHOPAEDIC SURGERY

## 2021-03-29 PROCEDURE — 99213 OFFICE O/P EST LOW 20 MIN: CPT | Performed by: ORTHOPAEDIC SURGERY

## 2021-03-29 RX ORDER — ACETAMINOPHEN 160 MG
TABLET,DISINTEGRATING ORAL
COMMUNITY

## 2021-03-31 NOTE — PROGRESS NOTES
General Exam    Patient: Arpit Godoy    YOB: 1960    Medical Record Number: [id number]        History of Present Illness:     60 y.o. male patient who presents for evaluation treatment of pelvic rami fractures.  Patient was seen hospital diagnosed with superior inferior pubic rami fractures.  He has been protected weightbearing with a walker and states he is doing quite well pain is minimal.  No new issues.    Denies any numbness or tingling.  Denies any fevers, cough or shortness of breath.    Allergies: No Known Allergies    Home Medications:      Current Outpatient Medications:   •  amLODIPine (NORVASC) 2.5 MG tablet, Take 2.5 mg by mouth Daily., Disp: , Rfl:   •  aspirin 81 MG chewable tablet, Chew 81 mg Daily., Disp: , Rfl:   •  Cholecalciferol (Vitamin D3) 50 MCG (2000 UT) capsule, Take  by mouth., Disp: , Rfl:   •  NON FORMULARY, relief factor, Disp: , Rfl:   •  HYDROcodone-acetaminophen (NORCO) 7.5-325 MG per tablet, Take 1 tablet by mouth Every 4 (Four) Hours As Needed for Moderate Pain ., Disp: 15 tablet, Rfl: 0    Past Medical History:   Diagnosis Date   • Hypertension        Past Surgical History:   Procedure Laterality Date   • APPENDECTOMY         Social History     Occupational History   • Not on file   Tobacco Use   • Smoking status: Former Smoker     Types: Cigarettes   • Smokeless tobacco: Never Used   Vaping Use   • Vaping Use: Every day   • Substances: Nicotine   Substance and Sexual Activity   • Alcohol use: Yes     Comment: 6-8 beers nightly   • Drug use: Yes   • Sexual activity: Defer      Social History     Social History Narrative   • Not on file       History reviewed. No pertinent family history.    Review of Systems:      Constitutional: Denies fever, shaking or chills   Eyes: Denies change in visual acuity   HEENT: Denies nasal congestion or sore throat   Respiratory: Denies cough or shortness of breath   Cardiovascular: Denies chest pain or edema  Endocrine: Denies  "tremors, palpitations, intolerance of heat or cold, polyuria, polydipsia.  GI: Denies abdominal pain, nausea, vomiting, bloody stools or diarrhea  : Denies frequency, urgency, incontinence, retention, or nocturia.  Musculoskeletal: Denies numbness, tingling or loss of motor function except as above  Integument: Denies rash, lesion or ulceration   Neurologic: Denies headache or focal weakness, deficits  Heme: Denies spontaneous or excessive bleeding, epistaxis, hematuria, melena, fatigue, enlarged or tender lymph nodes.      All other pertinent positives and negatives as noted above in HPI.    Physical Exam: 60 y.o. male    Vitals:    03/29/21 1506   Temp: 96.4 °F (35.8 °C)   TempSrc: Temporal   Weight: 95.3 kg (210 lb)   Height: 177.8 cm (70\")       General:  Patient is awake and alert.  Appears in no acute distress or discomfort.    Psych:  Affect and demeanor are appropriate.    Eyes:  Conjunctiva and sclera appear grossly normal.  Eyes track well and EOM seem to be intact.    Ears:  No gross abnormalities.  Hearing adequate for the exam.    Cardiovascular:  Regular rate and rhythm.    Lungs:  Good chest expansion.  Breathing unlabored.    Lymph:  No palpable masses or adenopathy in the affected extremity    Musculoskeletal/Extremities:    Right lower extremity: No tenderness to palpation.  Hip range of motion full and painless.  Sensation intact distally 2+ pedal pulses.  Patient ambulates with a walker.         Radiology:       AP pelvis inlet and outlet views were taken and reviewed to evaluate the patient's complaint/s.    Image demonstrates right inferior and superior pubic rami fractures with no significant change in alignment compared to prior imaging.      Assessment: Right superior and inferior pubic rami fractures      Plan:      Discussed clinical imaging findings with patient day.  Recommend continued protected weightbearing with a walker for the next 4 weeks.  Recommended calcium and vitamin D " intake.  Patient is a  told him I think it be reasonable to stay off work until seen in follow-up in 4 weeks.     All questions were answered.  Patient understands and agrees with the plan.    Berry Jacome MD    03/29/2021    CC to Provider, No Known

## 2021-04-16 ENCOUNTER — OFFICE VISIT (OUTPATIENT)
Dept: INTERNAL MEDICINE | Facility: CLINIC | Age: 61
End: 2021-04-16

## 2021-04-16 VITALS
SYSTOLIC BLOOD PRESSURE: 140 MMHG | HEIGHT: 70 IN | TEMPERATURE: 96.9 F | WEIGHT: 221 LBS | BODY MASS INDEX: 31.64 KG/M2 | HEART RATE: 82 BPM | RESPIRATION RATE: 16 BRPM | DIASTOLIC BLOOD PRESSURE: 72 MMHG | OXYGEN SATURATION: 99 %

## 2021-04-16 DIAGNOSIS — Z00.00 ENCOUNTER FOR PREVENTIVE HEALTH EXAMINATION: Primary | ICD-10-CM

## 2021-04-16 DIAGNOSIS — Z11.59 NEED FOR HEPATITIS C SCREENING TEST: ICD-10-CM

## 2021-04-16 DIAGNOSIS — I10 ESSENTIAL HYPERTENSION: Chronic | ICD-10-CM

## 2021-04-16 DIAGNOSIS — Z12.2 ENCOUNTER FOR SCREENING FOR LUNG CANCER: ICD-10-CM

## 2021-04-16 DIAGNOSIS — R73.02 IGT (IMPAIRED GLUCOSE TOLERANCE): ICD-10-CM

## 2021-04-16 DIAGNOSIS — Z12.5 SCREENING FOR PROSTATE CANCER: ICD-10-CM

## 2021-04-16 PROBLEM — Z86.0101 HISTORY OF ADENOMATOUS POLYP OF COLON: Status: ACTIVE | Noted: 2021-04-16

## 2021-04-16 PROBLEM — Z86.010 HISTORY OF ADENOMATOUS POLYP OF COLON: Status: ACTIVE | Noted: 2021-04-16

## 2021-04-16 PROCEDURE — 99396 PREV VISIT EST AGE 40-64: CPT | Performed by: INTERNAL MEDICINE

## 2021-04-16 RX ORDER — VALSARTAN 80 MG/1
80 TABLET ORAL DAILY
Qty: 90 TABLET | Refills: 1 | Status: SHIPPED | OUTPATIENT
Start: 2021-04-16 | End: 2021-05-21 | Stop reason: SDUPTHER

## 2021-04-16 NOTE — PROGRESS NOTES
Subjective   Arpit Godoy is a 60 y.o. male.     Chief Complaint   Patient presents with   • New Patient   • Hypertension   • Fall     down stairs 5 weeks ago   • Hip Pain     Broke Pelvis          In for initial visit, transition of care, and annual preventive exam.  Sleeps about 6 to 7 hours per night on average.  No formal exercise.  Energy is pretty good.  Diet is pretty good.    Hypertension  This is a chronic problem. The current episode started more than 1 year ago. The problem is unchanged. Pertinent negatives include no chest pain, palpitations or shortness of breath.        The following portions of the patient's history were reviewed and updated as appropriate: allergies, current medications, past social history and problem list.    Outpatient Medications Marked as Taking for the 4/16/21 encounter (Office Visit) with Moe Haney MD   Medication Sig Dispense Refill   • aspirin 81 MG chewable tablet Chew 81 mg Daily.     • Multiple Vitamins-Calcium (DAILY COMBO MULTIVITS/CALCIUM PO) Take  by mouth.         Review of Systems   Constitutional: Negative for chills, diaphoresis, fatigue and unexpected weight change.   Respiratory: Negative for cough, chest tightness, shortness of breath and wheezing.    Cardiovascular: Negative for chest pain, palpitations and leg swelling.   Gastrointestinal: Negative for anal bleeding, constipation and diarrhea.   Endocrine: Negative for cold intolerance, heat intolerance and polyuria.   Genitourinary: Negative for difficulty urinating, dysuria, frequency and urgency.   Musculoskeletal: Positive for arthralgias ( hands). Negative for back pain and myalgias.   Allergic/Immunologic: Positive for environmental allergies.   Neurological: Negative for dizziness, syncope, weakness and light-headedness.   Hematological: Negative for adenopathy. Does not bruise/bleed easily.   Psychiatric/Behavioral: Negative for confusion, dysphoric mood and sleep disturbance. The  patient is not nervous/anxious.        Objective   Vitals:    04/16/21 0827   BP: 140/72   Pulse: 82   Resp: 16   Temp: 96.9 °F (36.1 °C)   SpO2: 99%      Wt Readings from Last 3 Encounters:   04/16/21 100 kg (221 lb)   03/29/21 95.3 kg (210 lb)   03/13/21 99.9 kg (220 lb 3.8 oz)    Body mass index is 31.71 kg/m².      Physical Exam  Vitals and nursing note reviewed.   Constitutional:       Appearance: Normal appearance. He is well-developed.   HENT:      Right Ear: External ear normal.      Left Ear: External ear normal.      Nose: Nose normal.   Eyes:      General: No scleral icterus.     Conjunctiva/sclera: Conjunctivae normal.      Pupils: Pupils are equal, round, and reactive to light.   Neck:      Thyroid: No thyromegaly.      Vascular: No JVD.   Cardiovascular:      Rate and Rhythm: Normal rate and regular rhythm.      Heart sounds: Normal heart sounds. No murmur heard.   No friction rub. No gallop.    Pulmonary:      Effort: Pulmonary effort is normal. No respiratory distress.      Breath sounds: Normal breath sounds. No wheezing or rales.   Abdominal:      General: Bowel sounds are normal. There is no distension.      Palpations: Abdomen is soft. There is no mass.      Tenderness: There is no abdominal tenderness. There is no guarding or rebound.      Hernia: No hernia is present.   Musculoskeletal:         General: Normal range of motion.      Cervical back: Normal range of motion and neck supple.   Lymphadenopathy:      Cervical: No cervical adenopathy.   Skin:     General: Skin is warm and dry.   Neurological:      General: No focal deficit present.      Mental Status: He is alert and oriented to person, place, and time.      Deep Tendon Reflexes: Reflexes are normal and symmetric. Reflexes normal.   Psychiatric:         Mood and Affect: Mood normal.         Behavior: Behavior normal.         Thought Content: Thought content normal.         Judgment: Judgment normal.           Problems Addressed this  Visit     None      Visit Diagnoses     Encounter for preventive health examination    -  Primary      Diagnoses       Codes Comments    Encounter for preventive health examination    -  Primary ICD-10-CM: Z00.00  ICD-9-CM: V70.0         Assessment/Plan   In for initial visit, transition of care, and annual preventive exam.  Overall health has been pretty good.  He has a history of hypertension and adenomatous colon polyps.  He does have a chronic smoking history as well as some alcohol excess.  Averages about 6 beers per day and is now trying to cut that down.  He has a 43-pack-year smoking history.  Trying to quit right now.  He suffered a fall when he tripped down the basement steps about 5 weeks ago.  Suffered a pelvic fracture and a concussion.  So far he is making a reasonable recovery from that injury.  He is due for colonoscopy.  Did have a bout of constipation for 2 weeks prior to his fall but otherwise pretty much asymptomatic and bowel movements are back to normal.  He had comprehensive lab work in March 2021 with a CBC and CMP.  We will check a lipid profile, A1c, PSA, and hep C antibody today.  He is very much against any type of immunizations because he had some side effects to flu shots in the past.  He might consider them in the future but will not today.  He qualifies for low-dose CT scans of chest for lung cancer screening.  He wants to make sure his insurance coverage is intact and we will set up with the nurse navigator for this.  Will begin on Diovan 80 mg daily today and recheck blood pressure in 1 month and adjust from there.  He knows he needs to quit smoking.  I advised him to go cold turkey.  He has been doing some vaping.  He also needs to cut down on his alcohol intake.  Should be drinking no more than 1 or 2 beers per day if not 0.    Prevention counseling was performed today. The counseling performed was routine health maintenance topics.    The above information was reviewed again  today 04/16/21.  It continues to be accurate as reflected above and is unchanged.  History, physical and review of systems all reviewed and are unchanged.  Medications were reviewed today and continue the current dosing.    PPE today includes face mask and eye shield.               Dragon disclaimer:   Much of this encounter note is an electronic transcription/translation of spoken language to printed text. The electronic translation of spoken language may permit erroneous, or at times, nonsensical words or phrases to be inadvertently transcribed; Although I have reviewed the note for such errors, some may still exist.

## 2021-04-17 LAB
CHOLEST SERPL-MCNC: 183 MG/DL (ref 0–200)
CHOLEST/HDLC SERPL: 3.27 {RATIO}
HBA1C MFR BLD: 5.6 % (ref 4.8–5.6)
HCV AB S/CO SERPL IA: <0.1 S/CO RATIO (ref 0–0.9)
HDLC SERPL-MCNC: 56 MG/DL (ref 40–60)
LDLC SERPL CALC-MCNC: 95 MG/DL (ref 0–100)
PSA SERPL-MCNC: 0.35 NG/ML (ref 0–4)
TRIGL SERPL-MCNC: 186 MG/DL (ref 0–150)
VLDLC SERPL CALC-MCNC: 32 MG/DL (ref 5–40)

## 2021-04-21 ENCOUNTER — HOSPITAL ENCOUNTER (OUTPATIENT)
Dept: PET IMAGING | Facility: HOSPITAL | Age: 61
Discharge: HOME | End: 2021-04-21

## 2021-04-21 ENCOUNTER — CLINICAL SUPPORT (OUTPATIENT)
Dept: OTHER | Facility: HOSPITAL | Age: 61
End: 2021-04-21

## 2021-04-21 DIAGNOSIS — Z12.2 SCREENING FOR MALIGNANT NEOPLASM OF RESPIRATORY ORGAN: ICD-10-CM

## 2021-04-21 DIAGNOSIS — Z87.891 HISTORY OF SMOKING 30 OR MORE PACK YEARS: ICD-10-CM

## 2021-04-21 DIAGNOSIS — Z12.2 SCREENING FOR MALIGNANT NEOPLASM OF RESPIRATORY ORGAN: Primary | ICD-10-CM

## 2021-04-21 PROCEDURE — G0296 VISIT TO DETERM LDCT ELIG: HCPCS | Performed by: CLINICAL NURSE SPECIALIST

## 2021-04-21 PROCEDURE — 71271 CT THORAX LUNG CANCER SCR C-: CPT

## 2021-04-22 VITALS — WEIGHT: 216.2 LBS | HEIGHT: 70 IN | BODY MASS INDEX: 30.95 KG/M2

## 2021-04-22 NOTE — PROGRESS NOTES
I have called the patient in follow-up.  He has been called by the office of his PCP with results.  Additionally we dicussed the results in detail with benign appearance and he verbalizes understanding that his next low-dose lung cancer screening CT is due in 12 months.

## 2021-04-22 NOTE — PROGRESS NOTES
[facility] Low-Dose Lung Cancer CT Screening Visit    Arpit Godoy is a pleasant 60 y.o. male seen today at the request of Dr. Moe Haney in our Lung Cancer Screening Program, being seen for Lung Cancer Screening Counseling and a Shared Decision Making Visit prior to Low-Dose Lung Cancer Screening CT exam.    SMOKING HISTORY:   Social History     Tobacco Use   Smoking Status Former Smoker   • Packs/day: 0.50   • Types: Cigarettes   • Quit date: 3/6/2021   • Years since quittin.1   Smokeless Tobacco Never Used   Tobacco Comment    Former tobacco cigarette smoker quit about 6 weeks ago.  Smoked 1ppd for 38 years and .5 ppd for 10 years for a 43 pack year history.  Currently using a vape device with nicotine and flavoring.       Arpit Godoy is a former smoker quitting about 6 weeks ago with a 43 pack year history.  Reports no use of alternate forms of tobacco, marijuana or other substances.  He is using an electronic vape device with nicotine and flavoring now but states that he uses his vape device less than he smoked tobacco in the past.  Based on the recommendation of the [place] Preventive Services Task Force, this patient is at high risk for lung cancer and a low-dose CT screening scan is recommended.     We discussed the connection between Radon and Lung Cancer and the availability of free test kits.  Has a basement in his home that has never been tested for Radon.  The patient reports occupational exposure to asbestos.  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  Routine chest x-rays were completed at his job due to potential exposures in the workplace.   Some second-hand smoke exposure as a child with his father smoking in their home.   No current second-hand exposure.    The patient has had no hemoptysis, unintentional weight loss or  increasing shortness of breath. The patient is asymptomatic and has no signs or symptoms of lung cancer.   The patient reports NO personal history of cancer.  Additionally, reports a family history of lung cancer in his father who was diagnosed and  at the age of 86 with a widespread cancer involving the liver which was thought to start in the lung.   He has never been told of any chronic pulmonary disease.    Together we discussed the potential benefits and potential harms of being screened for lung cancer including the potential for follow-up diagnostic testing, risk for over diagnosis, false positive rate and total radiation exposure using the [facility] standardized decision aid.  We reviewed the patient's smoking history and counseled on the importance and health benefits of maintaining cigarette smoking abstinence.      Smoking Abstinence  DISCUSSION HELD TODAY:     We discussed that there are a number of resources and tobacco cessation interventions to assist with smoking cessation including the 1-800-Quit Now line, Health Department programs, [place] [facility] resources, and use of the [place] Department of Health and Human Services five keys for quitting and quit plan worksheet. On a scale of zero to ten, the patient rates their motivation to stay quit at a 5 out of 10 today.  The patient is believes that he will stay away from tobacco cigarettes but will continue to use an electronic cigarette.  He has been advised to cut back on his drinking alcohol as well and is struggling about to make these behavioral changes.  I have advised him of the risks associated with electronic cigarette use and encouraged him to develop and follow through with a quit plan.  We talked through some basic strategies that may help.    Recommendations for continued lung cancer screening:      We discussed the NCCN guidelines for lung cancer screening and the patient verbalized understanding that annual  screening is recommended until fifteen years beyond smoking as long as they have no other disease or comorbidity that would prevent them from receiving cancer treatments such as surgery should a lung cancer be detected. The [facility] Lung Cancer Screening Shared Decision-Making Tool was utilized as an aid in discussing whether or not screening was right. The patient has decided to proceed with a Low Dose Lung Cancer Screening CT today. The patient is aware that the results of his screening will be shared with the referring provider or PCP as well.       The patient verbalizes understanding that results of this low dose lung CT exam will be called and that assistance will be provided in arranging any necessary follow-up.    After review of the NCCN guidelines and recommendations for ongoing screening, the patient verbalized understanding of recommendations for follow-up. We discussed the importance of adherence to continued annual screening until 15 years beyond smoking or until other life-limiting comorbidities are present. We discussed the impact of comorbidities and ability and willing to undergo diagnosis and treatment.    The patient was counseled on the continued health benefits of having quit tobacco, maintaining smoking abstinence, smoking cessation strategies and resources, as well as the importance of adherence to annual lung cancer low-dose CT screening.    Arlene Kevin, MSN, APRN, ACNS-BC, AOCN, CHPN  Clinical Nurse Specialist

## 2021-04-22 NOTE — PATIENT INSTRUCTIONS
Patient verbalizes understanding that low-dose lung cancer screening is recommended every 12 months until 15 years beyond smoking tobacco cigarettes.

## 2021-04-26 ENCOUNTER — OFFICE VISIT (OUTPATIENT)
Dept: ORTHOPEDIC SURGERY | Facility: CLINIC | Age: 61
End: 2021-04-26

## 2021-04-26 VITALS — TEMPERATURE: 98 F | BODY MASS INDEX: 30.06 KG/M2 | HEIGHT: 70 IN | WEIGHT: 210 LBS

## 2021-04-26 DIAGNOSIS — S32.591D CLOSED FRACTURE OF RAMUS OF RIGHT PUBIS WITH ROUTINE HEALING, SUBSEQUENT ENCOUNTER: Primary | ICD-10-CM

## 2021-04-26 PROCEDURE — 99212 OFFICE O/P EST SF 10 MIN: CPT | Performed by: ORTHOPAEDIC SURGERY

## 2021-04-26 PROCEDURE — 72170 X-RAY EXAM OF PELVIS: CPT | Performed by: ORTHOPAEDIC SURGERY

## 2021-04-26 NOTE — PROGRESS NOTES
"Patient: Arpit Godoy  YOB: 1960 60 y.o. male  Medical Record Number: [id number]    Chief Complaint:   Chief Complaint   Patient presents with   • Pelvis - Pain, Follow-up       History of Present Illness:Arpit Godoy is a 60 y.o. male who presents for follow-up of right superior and inferior pubic rami fractures.  Patient reports he is doing well minimally using a walker.  Pain and function much improved.    Allergies: Not on File    Medications:   Current Outpatient Medications   Medication Sig Dispense Refill   • aspirin 81 MG chewable tablet Chew 81 mg Daily.     • Cholecalciferol (Vitamin D3) 50 MCG (2000 UT) capsule Take  by mouth.     • Multiple Vitamins-Calcium (DAILY COMBO MULTIVITS/CALCIUM PO) Take  by mouth.     • valsartan (Diovan) 80 MG tablet Take 1 tablet by mouth Daily. 90 tablet 1   • amLODIPine (NORVASC) 2.5 MG tablet Take 2.5 mg by mouth Daily.       No current facility-administered medications for this visit.         The following portions of the patient's history were reviewed and updated as appropriate: allergies, current medications, past family history, past medical history, past social history, past surgical history and problem list.    Review of Systems:   A 14 point review of systems was performed. All systems negative except pertinent positives/negative listed in HPI above    Physical Exam:   Vitals:    04/26/21 1305   Temp: 98 °F (36.7 °C)   Weight: 95.3 kg (210 lb)   Height: 177.8 cm (70\")   PainSc: 0-No pain       General: A and O x 3, ASA, NAD    SCLERA:    Normal    DENTITION:   Normal  Right lower extremity: Hip range of motion full and painless.  Patient stand from seated position gait normal unassisted.    Radiology:   4 views of the pelvis were taken to evaluate patient's complaints.  Ridging demonstrates superior and inferior pubic rami fractures on the right with evidence of healing.  There is improved callus and healing formation compared to prior " films.    Assessment/Plan:  Right inferior and superior pubic rami fracture      I discussed clinical and imaging findings with the patient.  Patient is doing quite well imaging shows fracture healing.  I instructed the patient may progress to normal activities as tolerated over the next 4 to 6 weeks.  As long as he continues to improve and no new symptoms he may follow-up as needed.  Patient may return to work.  Follow-up as needed all questions answered.      Berry Jacome MD  4/26/2021

## 2021-05-21 ENCOUNTER — OFFICE VISIT (OUTPATIENT)
Dept: INTERNAL MEDICINE | Facility: CLINIC | Age: 61
End: 2021-05-21

## 2021-05-21 VITALS
BODY MASS INDEX: 32.64 KG/M2 | RESPIRATION RATE: 18 BRPM | HEART RATE: 81 BPM | DIASTOLIC BLOOD PRESSURE: 92 MMHG | WEIGHT: 228 LBS | SYSTOLIC BLOOD PRESSURE: 166 MMHG | OXYGEN SATURATION: 100 % | TEMPERATURE: 97.7 F | HEIGHT: 70 IN

## 2021-05-21 DIAGNOSIS — M21.42 FALLEN ARCHES: ICD-10-CM

## 2021-05-21 DIAGNOSIS — M21.41 FALLEN ARCHES: ICD-10-CM

## 2021-05-21 DIAGNOSIS — I10 ESSENTIAL HYPERTENSION: Primary | Chronic | ICD-10-CM

## 2021-05-21 PROCEDURE — 99213 OFFICE O/P EST LOW 20 MIN: CPT | Performed by: INTERNAL MEDICINE

## 2021-05-21 RX ORDER — VALSARTAN 160 MG/1
160 TABLET ORAL DAILY
Qty: 90 TABLET | Refills: 1 | Status: SHIPPED | OUTPATIENT
Start: 2021-05-21 | End: 2021-07-30 | Stop reason: DRUGHIGH

## 2021-05-21 NOTE — PROGRESS NOTES
Subjective   Arpit Godoy is a 60 y.o. male.     Chief Complaint   Patient presents with   • Hypertension   • Feet Pain     Feels like arches have fallen    • Back Pain         Hypertension  This is a chronic problem. The current episode started more than 1 year ago. The problem is unchanged. Pertinent negatives include no palpitations or shortness of breath.        The following portions of the patient's history were reviewed and updated as appropriate: allergies, current medications, past social history and problem list.    Outpatient Medications Marked as Taking for the 5/21/21 encounter (Office Visit) with Moe Haney MD   Medication Sig Dispense Refill   • aspirin 81 MG chewable tablet Chew 81 mg Daily.     • CALCIUM-VITAMIN D PO Take  by mouth.     • Multiple Vitamins-Calcium (DAILY COMBO MULTIVITS/CALCIUM PO) Take  by mouth.     • valsartan (Diovan) 80 MG tablet Take 1 tablet by mouth Daily. 90 tablet 1       Review of Systems   Respiratory: Negative for shortness of breath and wheezing.    Cardiovascular: Negative for palpitations and leg swelling.   Gastrointestinal: Positive for diarrhea.       Objective   Vitals:    05/21/21 1503   BP: 166/92   Pulse: 81   Resp: 18   Temp: 97.7 °F (36.5 °C)   SpO2: 100%      Wt Readings from Last 3 Encounters:   05/21/21 103 kg (228 lb)   04/26/21 95.3 kg (210 lb)   04/21/21 98.1 kg (216 lb 3.2 oz)    Body mass index is 32.71 kg/m².      Physical Exam  Constitutional:       Appearance: Normal appearance.   Cardiovascular:      Rate and Rhythm: Normal rate and regular rhythm.      Heart sounds: Normal heart sounds. No gallop.    Pulmonary:      Effort: Pulmonary effort is normal. No respiratory distress.      Breath sounds: Normal breath sounds. No wheezing or rales.   Neurological:      Mental Status: He is alert.           Problems Addressed this Visit        Cardiac and Vasculature    Hypertension - Primary (Chronic)      Diagnoses       Codes Comments     Essential hypertension    -  Primary ICD-10-CM: I10  ICD-9-CM: 401.9         Assessment/Plan   He has a history of hypertension and adenomatous colon polyps.  He does have a chronic smoking history as well as some alcohol excess.  Averages about 6 beers per day and is now trying to cut that down.  He has a 43-pack-year smoking history.  Trying to quit right now.  He had comprehensive lab work in March 2021 with a CBC and CMP.  We will check a lipid profile, A1c, PSA, and hep C antibody 4/2021.  He is very much against any type of immunizations because he had some side effects to flu shots in the past.  He might consider them in the future but will not today.  Blood pressures running high about half to two thirds of the time.  We will increase Diovan from 80 mg daily to 160 mg daily.  Recheck blood pressure in 6 weeks.    The above information was reviewed again today 05/21/21.  It continues to be accurate as reflected above and is unchanged.  History, physical and review of systems all reviewed and are unchanged.  Medications were reviewed today and continue the current dosing.    PPE today includes face mask and eye shield.         Dragon disclaimer:   Much of this encounter note is an electronic transcription/translation of spoken language to printed text. The electronic translation of spoken language may permit erroneous, or at times, nonsensical words or phrases to be inadvertently transcribed; Although I have reviewed the note for such errors, some may still exist.

## 2021-07-30 ENCOUNTER — OFFICE VISIT (OUTPATIENT)
Dept: INTERNAL MEDICINE | Facility: CLINIC | Age: 61
End: 2021-07-30

## 2021-07-30 VITALS
BODY MASS INDEX: 31.5 KG/M2 | TEMPERATURE: 97.3 F | HEIGHT: 70 IN | DIASTOLIC BLOOD PRESSURE: 72 MMHG | WEIGHT: 220 LBS | SYSTOLIC BLOOD PRESSURE: 146 MMHG | HEART RATE: 98 BPM | RESPIRATION RATE: 18 BRPM | OXYGEN SATURATION: 97 %

## 2021-07-30 DIAGNOSIS — I10 ESSENTIAL HYPERTENSION: Primary | Chronic | ICD-10-CM

## 2021-07-30 PROCEDURE — 99213 OFFICE O/P EST LOW 20 MIN: CPT | Performed by: INTERNAL MEDICINE

## 2021-07-30 RX ORDER — VALSARTAN AND HYDROCHLOROTHIAZIDE 160; 12.5 MG/1; MG/1
1 TABLET, FILM COATED ORAL DAILY
Qty: 90 TABLET | Refills: 1 | Status: SHIPPED | OUTPATIENT
Start: 2021-07-30 | End: 2022-03-08

## 2021-07-30 NOTE — PROGRESS NOTES
Subjective   Arpit Godoy is a 61 y.o. male.     Chief Complaint   Patient presents with   • Hypertension         Hypertension  This is a chronic problem. The current episode started more than 1 year ago. The problem is unchanged. Pertinent negatives include no palpitations or shortness of breath.        The following portions of the patient's history were reviewed and updated as appropriate: allergies, current medications, past social history and problem list.    Outpatient Medications Marked as Taking for the 7/30/21 encounter (Office Visit) with Moe Haney MD   Medication Sig Dispense Refill   • aspirin 81 MG chewable tablet Chew 81 mg Daily.     • CALCIUM-VITAMIN D PO Take  by mouth.     • Cholecalciferol (Vitamin D3) 50 MCG (2000 UT) capsule Take  by mouth.     • Multiple Vitamins-Calcium (DAILY COMBO MULTIVITS/CALCIUM PO) Take  by mouth.     • valsartan (Diovan) 160 MG tablet Take 1 tablet by mouth Daily. 90 tablet 1       Review of Systems   Respiratory: Negative for shortness of breath and wheezing.    Cardiovascular: Negative for palpitations and leg swelling.   Gastrointestinal: Positive for diarrhea.       Objective   Vitals:    07/30/21 1522   BP: 146/72   Pulse: 98   Resp: 18   Temp: 97.3 °F (36.3 °C)   SpO2: 97%      Wt Readings from Last 3 Encounters:   07/30/21 99.8 kg (220 lb)   05/21/21 103 kg (228 lb)   04/26/21 95.3 kg (210 lb)    Body mass index is 31.57 kg/m².      Physical Exam  Constitutional:       Appearance: Normal appearance.   Cardiovascular:      Rate and Rhythm: Normal rate and regular rhythm.      Heart sounds: Normal heart sounds. No gallop.    Pulmonary:      Effort: Pulmonary effort is normal. No respiratory distress.      Breath sounds: Normal breath sounds. No wheezing or rales.   Neurological:      Mental Status: He is alert.           Problems Addressed this Visit        Cardiac and Vasculature    Hypertension - Primary (Chronic)      Diagnoses       Codes  Comments    Essential hypertension    -  Primary ICD-10-CM: I10  ICD-9-CM: 401.9         Assessment/Plan   He has a history of hypertension and adenomatous colon polyps.  He does have a chronic smoking history as well as some alcohol excess.  Averages about 6 beers per day and is now trying to cut that down.  He has a 43-pack-year smoking history.  Trying to quit right now.  He had comprehensive lab work in March 2021 with a CBC and CMP.  We will check a lipid profile, A1c, PSA antibody 4/2021.  He is very much against any type of immunizations because he had some side effects to flu shots in the past.  He might consider them in the future but will not today.  Blood pressures running high about half to two thirds of the time.  We increased Diovan from 80 mg daily to 160 mg  in May blood pressure is little bit better now.  We will switch to Diovan /12.5 today.  Try to cut beer intake in half.    The above information was reviewed again today 07/30/21.  It continues to be accurate as reflected above and is unchanged.  History, physical and review of systems all reviewed and are unchanged.  Medications were reviewed today and continue the current dosing.    PPE today includes face mask and eye shield.         Dragon disclaimer:   Much of this encounter note is an electronic transcription/translation of spoken language to printed text. The electronic translation of spoken language may permit erroneous, or at times, nonsensical words or phrases to be inadvertently transcribed; Although I have reviewed the note for such errors, some may still exist.

## 2021-10-29 ENCOUNTER — OFFICE VISIT (OUTPATIENT)
Dept: INTERNAL MEDICINE | Facility: CLINIC | Age: 61
End: 2021-10-29

## 2021-10-29 VITALS
RESPIRATION RATE: 18 BRPM | OXYGEN SATURATION: 98 % | TEMPERATURE: 97.1 F | WEIGHT: 221 LBS | SYSTOLIC BLOOD PRESSURE: 138 MMHG | HEART RATE: 82 BPM | DIASTOLIC BLOOD PRESSURE: 76 MMHG | HEIGHT: 70 IN | BODY MASS INDEX: 31.64 KG/M2

## 2021-10-29 DIAGNOSIS — I10 PRIMARY HYPERTENSION: Primary | Chronic | ICD-10-CM

## 2021-10-29 PROCEDURE — 99213 OFFICE O/P EST LOW 20 MIN: CPT | Performed by: INTERNAL MEDICINE

## 2021-10-29 NOTE — PROGRESS NOTES
Subjective   Arpit Godoy is a 61 y.o. male.     Chief Complaint   Patient presents with   • Hypertension         Hypertension  This is a chronic problem. The current episode started more than 1 year ago. The problem is unchanged. Pertinent negatives include no palpitations or shortness of breath.        The following portions of the patient's history were reviewed and updated as appropriate: allergies, current medications, past social history and problem list.    Outpatient Medications Marked as Taking for the 10/29/21 encounter (Office Visit) with Moe Haney MD   Medication Sig Dispense Refill   • aspirin 81 MG chewable tablet Chew 81 mg Daily.     • CALCIUM-VITAMIN D PO Take  by mouth.     • Cholecalciferol (Vitamin D3) 50 MCG (2000 UT) capsule Take  by mouth.     • Multiple Vitamins-Calcium (DAILY COMBO MULTIVITS/CALCIUM PO) Take  by mouth.     • valsartan-hydrochlorothiazide (Diovan HCT) 160-12.5 MG per tablet Take 1 tablet by mouth Daily. 90 tablet 1       Review of Systems   Respiratory: Negative for shortness of breath and wheezing.    Cardiovascular: Negative for palpitations and leg swelling.   Gastrointestinal: Positive for diarrhea.       Objective   Vitals:    10/29/21 1507   BP: 138/76   Pulse: 82   Resp: 18   Temp: 97.1 °F (36.2 °C)   SpO2: 98%      Wt Readings from Last 3 Encounters:   10/29/21 100 kg (221 lb)   07/30/21 99.8 kg (220 lb)   05/21/21 103 kg (228 lb)    Body mass index is 31.71 kg/m².      Physical Exam  Constitutional:       Appearance: Normal appearance.   Cardiovascular:      Rate and Rhythm: Normal rate and regular rhythm.      Heart sounds: Murmur heard.    Crescendo decrescendo systolic murmur is present with a grade of 2/6.  No gallop.       Comments: Grade 2 MIKI throughout  Pulmonary:      Effort: Pulmonary effort is normal. No respiratory distress.      Breath sounds: Normal breath sounds. No wheezing or rales.   Neurological:      Mental Status: He is alert.            Problems Addressed this Visit        Cardiac and Vasculature    Hypertension - Primary (Chronic)      Diagnoses       Codes Comments    Primary hypertension    -  Primary ICD-10-CM: I10  ICD-9-CM: 401.9         Assessment/Plan   He has a history of hypertension and adenomatous colon polyps.  He does have a chronic smoking history as well as some alcohol excess.  Averages about 6 beers per day and is now trying to cut that down.  He has a 43-pack-year smoking history.  Trying to quit right now.  He had comprehensive lab work in March 2021 with a CBC and CMP.  We will check a lipid profile, A1c, PSA antibody 4/2021.  He is very much against any type of immunizations because he had some side effects to flu shots in the past.  He might consider them in the future but will not today.  Blood pressures had been running high.  Last visit we switched him to Diovan HCT and his blood pressures been fantastic since that time.  Running 130/60 range now.  He feels great.  He has had no success at all trying to cut down his alcohol intake.  We will move out to 6 months on visits.  The above information was reviewed again today 10/29/21.  It continues to be accurate as reflected above and is unchanged.  History, physical and review of systems all reviewed and are unchanged.  Medications were reviewed today and continue the current dosing.    PPE today includes face mask and eye shield.           Dragon disclaimer:   Much of this encounter note is an electronic transcription/translation of spoken language to printed text. The electronic translation of spoken language may permit erroneous, or at times, nonsensical words or phrases to be inadvertently transcribed; Although I have reviewed the note for such errors, some may still exist.

## 2022-03-08 RX ORDER — VALSARTAN AND HYDROCHLOROTHIAZIDE 160; 12.5 MG/1; MG/1
1 TABLET, FILM COATED ORAL DAILY
Qty: 90 TABLET | Refills: 1 | Status: SHIPPED | OUTPATIENT
Start: 2022-03-08 | End: 2022-09-26

## 2022-04-29 ENCOUNTER — OFFICE VISIT (OUTPATIENT)
Dept: INTERNAL MEDICINE | Facility: CLINIC | Age: 62
End: 2022-04-29

## 2022-04-29 VITALS
TEMPERATURE: 97.3 F | RESPIRATION RATE: 16 BRPM | OXYGEN SATURATION: 98 % | HEIGHT: 70 IN | WEIGHT: 214.2 LBS | HEART RATE: 99 BPM | BODY MASS INDEX: 30.67 KG/M2 | DIASTOLIC BLOOD PRESSURE: 72 MMHG | SYSTOLIC BLOOD PRESSURE: 139 MMHG

## 2022-04-29 DIAGNOSIS — I10 PRIMARY HYPERTENSION: Chronic | ICD-10-CM

## 2022-04-29 DIAGNOSIS — Z12.5 SCREENING FOR PROSTATE CANCER: ICD-10-CM

## 2022-04-29 DIAGNOSIS — Z00.00 ENCOUNTER FOR PREVENTIVE HEALTH EXAMINATION: Primary | ICD-10-CM

## 2022-04-29 PROCEDURE — 99396 PREV VISIT EST AGE 40-64: CPT | Performed by: INTERNAL MEDICINE

## 2022-04-29 NOTE — PROGRESS NOTES
Subjective   Arpit Godoy is a 61 y.o. male.     Chief Complaint   Patient presents with   • Hypertension   • Insect Bite     Patient states that he may have spider bites for about 6 weeks, in the middle of his abdomen. Patient has concerns regarding the bites.         In for annual preventive exam.  Sleeps about 6 hours per night on average.  No formal exercise.  Energy is pretty good.  Diet is pretty good.    Hypertension  This is a chronic problem. The current episode started more than 1 year ago. The problem is unchanged. Pertinent negatives include no chest pain, headaches, palpitations or shortness of breath.   Insect Bite  Associated symptoms include arthralgias ( hands). Pertinent negatives include no abdominal pain, chest pain, chills, coughing, diaphoresis, fatigue, fever, headaches, myalgias, nausea, vomiting or weakness.        The following portions of the patient's history were reviewed and updated as appropriate: allergies, current medications, past social history and problem list.    Outpatient Medications Marked as Taking for the 4/29/22 encounter (Office Visit) with Moe Haney MD   Medication Sig Dispense Refill   • CALCIUM-VITAMIN D PO Take  by mouth.     • Cholecalciferol (Vitamin D3) 50 MCG (2000 UT) capsule Take  by mouth.     • Multiple Vitamins-Calcium (DAILY COMBO MULTIVITS/CALCIUM PO) Take  by mouth.     • valsartan-hydrochlorothiazide (DIOVAN-HCT) 160-12.5 MG per tablet TAKE 1 TABLET BY MOUTH DAILY 90 tablet 1       Review of Systems   Constitutional: Negative for chills, diaphoresis, fatigue, fever and unexpected weight change.   Respiratory: Negative for cough, chest tightness, shortness of breath and wheezing.    Cardiovascular: Negative for chest pain, palpitations and leg swelling.   Gastrointestinal: Positive for diarrhea (chronic IBS on and off). Negative for abdominal pain, anal bleeding, blood in stool, constipation, nausea, rectal pain and vomiting.   Endocrine:  Negative for cold intolerance, heat intolerance and polyuria.   Genitourinary: Negative for difficulty urinating, dysuria, frequency, hematuria and urgency.   Musculoskeletal: Positive for arthralgias ( hands) and back pain. Negative for myalgias.   Allergic/Immunologic: Positive for environmental allergies.   Neurological: Negative for dizziness, syncope, weakness, light-headedness and headaches.   Hematological: Negative for adenopathy. Does not bruise/bleed easily.   Psychiatric/Behavioral: Negative for confusion, dysphoric mood and sleep disturbance. The patient is not nervous/anxious.        Objective   Vitals:    04/29/22 1500   BP: 139/72   Pulse: 99   Resp: 16   Temp: 97.3 °F (36.3 °C)   SpO2: 98%      Wt Readings from Last 3 Encounters:   04/29/22 97.2 kg (214 lb 3.2 oz)   10/29/21 100 kg (221 lb)   07/30/21 99.8 kg (220 lb)    Body mass index is 30.73 kg/m².      Physical Exam  Vitals and nursing note reviewed.   Constitutional:       Appearance: Normal appearance. He is well-developed.   HENT:      Right Ear: External ear normal.      Left Ear: External ear normal.      Nose: Nose normal.   Eyes:      General: No scleral icterus.     Conjunctiva/sclera: Conjunctivae normal.      Pupils: Pupils are equal, round, and reactive to light.   Neck:      Thyroid: No thyromegaly.      Vascular: No JVD.   Cardiovascular:      Rate and Rhythm: Normal rate and regular rhythm.      Heart sounds: Normal heart sounds. No murmur heard.    No friction rub. No gallop.   Pulmonary:      Effort: Pulmonary effort is normal. No respiratory distress.      Breath sounds: Normal breath sounds. No wheezing or rales.   Abdominal:      General: Bowel sounds are normal. There is no distension.      Palpations: Abdomen is soft. There is no mass.      Tenderness: There is no abdominal tenderness. There is no guarding or rebound.      Hernia: No hernia is present.   Musculoskeletal:         General: Normal range of motion.      Cervical  back: Normal range of motion and neck supple.   Lymphadenopathy:      Cervical: No cervical adenopathy.   Skin:     General: Skin is warm and dry.   Neurological:      General: No focal deficit present.      Mental Status: He is alert and oriented to person, place, and time.      Deep Tendon Reflexes: Reflexes are normal and symmetric. Reflexes normal.   Psychiatric:         Mood and Affect: Mood normal.         Behavior: Behavior normal.         Thought Content: Thought content normal.         Judgment: Judgment normal.           Problems Addressed this Visit        Cardiac and Vasculature    Hypertension - Primary (Chronic)      Diagnoses       Codes Comments    Primary hypertension    -  Primary ICD-10-CM: I10  ICD-9-CM: 401.9         Assessment/Plan   In for annual preventive exam today April 2022.   Overall health has been pretty good.  He has a history of hypertension and adenomatous colon polyps.  He does have a chronic smoking history as well as some alcohol excess.  Averages about 6 beers per day and is now trying to cut that down.  He has a 43-pack-year smoking history.  So far he is making a reasonable recovery from that injury.  He is due for colonoscopy.  He is due for annual lab work today April 2022 including CBC, CMP, lipids, PSA, UA.  He is very much against any type of immunizations because he had some side effects to flu shots in the past.  He might consider them in the future but will not today.  He qualifies for low-dose CT scans of chest for lung cancer screening.  He wants to make sure his insurance coverage is intact and we will set up with the nurse navigator for this.  Now on Diovan 80 mg daily and HCT 12.5 mg daily for hypertension.  Blood pressures at home are running in the 130s to 137/50-60 range.  He also needs to cut down on his alcohol intake.  Should be drinking no more than 1 or 2 beers per day if not 0.  He is due for his 5-year colonoscopy screening.  We will check with the GI  doctor from [facility] that did his last colonoscopy.  If he cannot find one we will get him a new endoscopist.  We will move blood pressure checks out for 6 months.  He is 3.5 HPP today.    Prevention counseling was performed today. The counseling performed was routine health maintenance topics including BMI and exercise.    The above information was reviewed again today 04/29/22.  It continues to be accurate as reflected above and is unchanged.  History, physical and review of systems all reviewed and are unchanged.  Medications were reviewed today and continue the current dosing.    PPE today includes face mask and eye shield.             Dragon disclaimer:   Much of this encounter note is an electronic transcription/translation of spoken language to printed text. The electronic translation of spoken language may permit erroneous, or at times, nonsensical words or phrases to be inadvertently transcribed; Although I have reviewed the note for such errors, some may still exist.

## 2022-04-30 LAB
ALBUMIN SERPL-MCNC: 4.5 G/DL (ref 3.8–4.8)
ALBUMIN/GLOB SERPL: 2 {RATIO} (ref 1.2–2.2)
ALP SERPL-CCNC: 79 IU/L (ref 44–121)
ALT SERPL-CCNC: 21 IU/L (ref 0–44)
AST SERPL-CCNC: 19 IU/L (ref 0–40)
BASOPHILS # BLD AUTO: 0.1 X10E3/UL (ref 0–0.2)
BASOPHILS NFR BLD AUTO: 1 %
BILIRUB SERPL-MCNC: 0.3 MG/DL (ref 0–1.2)
BUN SERPL-MCNC: 22 MG/DL (ref 8–27)
BUN/CREAT SERPL: 23 (ref 10–24)
CALCIUM SERPL-MCNC: 9.6 MG/DL (ref 8.6–10.2)
CHLORIDE SERPL-SCNC: 101 MMOL/L (ref 96–106)
CHOLEST SERPL-MCNC: 165 MG/DL (ref 100–199)
CHOLEST/HDLC SERPL: 3.1 RATIO (ref 0–5)
CO2 SERPL-SCNC: 17 MMOL/L (ref 20–29)
CREAT SERPL-MCNC: 0.97 MG/DL (ref 0.76–1.27)
EGFRCR SERPLBLD CKD-EPI 2021: 88 ML/MIN/1.73
EOSINOPHIL # BLD AUTO: 0.5 X10E3/UL (ref 0–0.4)
EOSINOPHIL NFR BLD AUTO: 5 %
ERYTHROCYTE [DISTWIDTH] IN BLOOD BY AUTOMATED COUNT: 11.4 % (ref 11.6–15.4)
GLOBULIN SER CALC-MCNC: 2.3 G/DL (ref 1.5–4.5)
GLUCOSE SERPL-MCNC: 98 MG/DL (ref 65–99)
HCT VFR BLD AUTO: 40.6 % (ref 37.5–51)
HDLC SERPL-MCNC: 54 MG/DL
HGB BLD-MCNC: 13.8 G/DL (ref 13–17.7)
IMM GRANULOCYTES # BLD AUTO: 0 X10E3/UL (ref 0–0.1)
IMM GRANULOCYTES NFR BLD AUTO: 0 %
LDLC SERPL CALC-MCNC: 69 MG/DL (ref 0–99)
LYMPHOCYTES # BLD AUTO: 2.4 X10E3/UL (ref 0.7–3.1)
LYMPHOCYTES NFR BLD AUTO: 25 %
MCH RBC QN AUTO: 32 PG (ref 26.6–33)
MCHC RBC AUTO-ENTMCNC: 34 G/DL (ref 31.5–35.7)
MCV RBC AUTO: 94 FL (ref 79–97)
MONOCYTES # BLD AUTO: 0.9 X10E3/UL (ref 0.1–0.9)
MONOCYTES NFR BLD AUTO: 9 %
NEUTROPHILS # BLD AUTO: 5.6 X10E3/UL (ref 1.4–7)
NEUTROPHILS NFR BLD AUTO: 60 %
PLATELET # BLD AUTO: 289 X10E3/UL (ref 150–450)
POTASSIUM SERPL-SCNC: 4.2 MMOL/L (ref 3.5–5.2)
PROT SERPL-MCNC: 6.8 G/DL (ref 6–8.5)
PSA SERPL-MCNC: 0.6 NG/ML (ref 0–4)
RBC # BLD AUTO: 4.31 X10E6/UL (ref 4.14–5.8)
SODIUM SERPL-SCNC: 139 MMOL/L (ref 134–144)
TRIGL SERPL-MCNC: 259 MG/DL (ref 0–149)
VLDLC SERPL CALC-MCNC: 42 MG/DL (ref 5–40)
WBC # BLD AUTO: 9.3 X10E3/UL (ref 3.4–10.8)

## 2022-07-05 ENCOUNTER — HOSPITAL ENCOUNTER (EMERGENCY)
Facility: HOSPITAL | Age: 62
Discharge: HOME | End: 2022-07-05
Attending: EMERGENCY MEDICINE | Admitting: EMERGENCY MEDICINE

## 2022-07-05 ENCOUNTER — APPOINTMENT (OUTPATIENT)
Dept: GENERAL RADIOLOGY | Facility: HOSPITAL | Age: 62
End: 2022-07-05

## 2022-07-05 VITALS
TEMPERATURE: 98.1 F | DIASTOLIC BLOOD PRESSURE: 86 MMHG | HEART RATE: 87 BPM | SYSTOLIC BLOOD PRESSURE: 147 MMHG | OXYGEN SATURATION: 98 % | RESPIRATION RATE: 16 BRPM

## 2022-07-05 DIAGNOSIS — R53.83 MALAISE AND FATIGUE: Primary | ICD-10-CM

## 2022-07-05 DIAGNOSIS — R53.81 MALAISE AND FATIGUE: Primary | ICD-10-CM

## 2022-07-05 DIAGNOSIS — E86.0 DEHYDRATION: ICD-10-CM

## 2022-07-05 DIAGNOSIS — M79.10 MYALGIA: ICD-10-CM

## 2022-07-05 LAB
ALBUMIN SERPL-MCNC: 4.6 G/DL (ref 3.5–5.2)
ALBUMIN/GLOB SERPL: 2.3 G/DL
ALP SERPL-CCNC: 85 U/L (ref 39–117)
ALT SERPL W P-5'-P-CCNC: 27 U/L (ref 1–41)
ANION GAP SERPL CALCULATED.3IONS-SCNC: 11.4 MMOL/L (ref 5–15)
AST SERPL-CCNC: 18 U/L (ref 1–40)
BASOPHILS # BLD AUTO: 0.04 10*3/MM3 (ref 0–0.2)
BASOPHILS NFR BLD AUTO: 0.5 % (ref 0–1.5)
BILIRUB SERPL-MCNC: 0.3 MG/DL (ref 0–1.2)
BILIRUB UR QL STRIP: NEGATIVE
BUN SERPL-MCNC: 17 MG/DL (ref 8–23)
BUN/CREAT SERPL: 12.9 (ref 7–25)
CALCIUM SPEC-SCNC: 9.7 MG/DL (ref 8.6–10.5)
CHLORIDE SERPL-SCNC: 101 MMOL/L (ref 98–107)
CK SERPL-CCNC: 77 U/L (ref 20–200)
CLARITY UR: CLEAR
CO2 SERPL-SCNC: 23.6 MMOL/L (ref 22–29)
COLOR UR: YELLOW
CREAT SERPL-MCNC: 1.32 MG/DL (ref 0.76–1.27)
DEPRECATED RDW RBC AUTO: 36.5 FL (ref 37–54)
EGFRCR SERPLBLD CKD-EPI 2021: 60.2 ML/MIN/1.73
EOSINOPHIL # BLD AUTO: 0.36 10*3/MM3 (ref 0–0.4)
EOSINOPHIL NFR BLD AUTO: 4.1 % (ref 0.3–6.2)
ERYTHROCYTE [DISTWIDTH] IN BLOOD BY AUTOMATED COUNT: 11.6 % (ref 12.3–15.4)
GLOBULIN UR ELPH-MCNC: 2 GM/DL
GLUCOSE SERPL-MCNC: 107 MG/DL (ref 65–99)
GLUCOSE UR STRIP-MCNC: NEGATIVE MG/DL
HCT VFR BLD AUTO: 34.9 % (ref 37.5–51)
HGB BLD-MCNC: 12.8 G/DL (ref 13–17.7)
HGB UR QL STRIP.AUTO: NEGATIVE
HOLD SPECIMEN: NORMAL
HOLD SPECIMEN: NORMAL
IMM GRANULOCYTES # BLD AUTO: 0.05 10*3/MM3 (ref 0–0.05)
IMM GRANULOCYTES NFR BLD AUTO: 0.6 % (ref 0–0.5)
KETONES UR QL STRIP: NEGATIVE
LEUKOCYTE ESTERASE UR QL STRIP.AUTO: NEGATIVE
LYMPHOCYTES # BLD AUTO: 1.88 10*3/MM3 (ref 0.7–3.1)
LYMPHOCYTES NFR BLD AUTO: 21.2 % (ref 19.6–45.3)
MAGNESIUM SERPL-MCNC: 2 MG/DL (ref 1.6–2.4)
MCH RBC QN AUTO: 32.8 PG (ref 26.6–33)
MCHC RBC AUTO-ENTMCNC: 36.7 G/DL (ref 31.5–35.7)
MCV RBC AUTO: 89.5 FL (ref 79–97)
MONOCYTES # BLD AUTO: 0.84 10*3/MM3 (ref 0.1–0.9)
MONOCYTES NFR BLD AUTO: 9.5 % (ref 5–12)
NEUTROPHILS NFR BLD AUTO: 5.71 10*3/MM3 (ref 1.7–7)
NEUTROPHILS NFR BLD AUTO: 64.1 % (ref 42.7–76)
NITRITE UR QL STRIP: NEGATIVE
NRBC BLD AUTO-RTO: 0 /100 WBC (ref 0–0.2)
PH UR STRIP.AUTO: <=5 [PH] (ref 5–8)
PLATELET # BLD AUTO: 270 10*3/MM3 (ref 140–450)
PMV BLD AUTO: 8.8 FL (ref 6–12)
POTASSIUM SERPL-SCNC: 3.9 MMOL/L (ref 3.5–5.2)
PROT SERPL-MCNC: 6.6 G/DL (ref 6–8.5)
PROT UR QL STRIP: NEGATIVE
RBC # BLD AUTO: 3.9 10*6/MM3 (ref 4.14–5.8)
SARS-COV-2 RNA RESP QL NAA+PROBE: NOT DETECTED
SODIUM SERPL-SCNC: 136 MMOL/L (ref 136–145)
SP GR UR STRIP: 1.01 (ref 1–1.03)
TROPONIN T SERPL-MCNC: <0.01 NG/ML (ref 0–0.03)
UROBILINOGEN UR QL STRIP: NORMAL
WBC NRBC COR # BLD: 8.88 10*3/MM3 (ref 3.4–10.8)
WHOLE BLOOD HOLD COAG: NORMAL
WHOLE BLOOD HOLD SPECIMEN: NORMAL

## 2022-07-05 PROCEDURE — 99283 EMERGENCY DEPT VISIT LOW MDM: CPT

## 2022-07-05 PROCEDURE — 84484 ASSAY OF TROPONIN QUANT: CPT

## 2022-07-05 PROCEDURE — U0003 INFECTIOUS AGENT DETECTION BY NUCLEIC ACID (DNA OR RNA); SEVERE ACUTE RESPIRATORY SYNDROME CORONAVIRUS 2 (SARS-COV-2) (CORONAVIRUS DISEASE [COVID-19]), AMPLIFIED PROBE TECHNIQUE, MAKING USE OF HIGH THROUGHPUT TECHNOLOGIES AS DESCRIBED BY CMS-2020-01-R: HCPCS | Performed by: EMERGENCY MEDICINE

## 2022-07-05 PROCEDURE — 81003 URINALYSIS AUTO W/O SCOPE: CPT

## 2022-07-05 PROCEDURE — 71045 X-RAY EXAM CHEST 1 VIEW: CPT

## 2022-07-05 PROCEDURE — 82550 ASSAY OF CK (CPK): CPT | Performed by: EMERGENCY MEDICINE

## 2022-07-05 PROCEDURE — 83735 ASSAY OF MAGNESIUM: CPT

## 2022-07-05 PROCEDURE — 80053 COMPREHEN METABOLIC PANEL: CPT

## 2022-07-05 PROCEDURE — 93005 ELECTROCARDIOGRAM TRACING: CPT | Performed by: EMERGENCY MEDICINE

## 2022-07-05 PROCEDURE — 36415 COLL VENOUS BLD VENIPUNCTURE: CPT

## 2022-07-05 PROCEDURE — 93010 ELECTROCARDIOGRAM REPORT: CPT | Performed by: INTERNAL MEDICINE

## 2022-07-05 PROCEDURE — 93005 ELECTROCARDIOGRAM TRACING: CPT

## 2022-07-05 PROCEDURE — 85025 COMPLETE CBC W/AUTO DIFF WBC: CPT

## 2022-07-05 RX ORDER — SODIUM CHLORIDE 0.9 % (FLUSH) 0.9 %
10 SYRINGE (ML) INJECTION AS NEEDED
Status: DISCONTINUED | OUTPATIENT
Start: 2022-07-05 | End: 2022-07-06 | Stop reason: HOSPADM

## 2022-07-05 RX ADMIN — SODIUM CHLORIDE 1000 ML: 9 INJECTION, SOLUTION INTRAVENOUS at 20:55

## 2022-07-05 NOTE — ED NOTES
Pt via PV from home with c/o fatigue, muscle cramps, dry mouth, exhaustion since Friday.     All triage performed with this RN wearing appropriate PPE.  Pt placed in mask upon arrival to ED.    
97

## 2022-07-06 LAB — QT INTERVAL: 366 MS

## 2022-07-06 NOTE — ED PROVIDER NOTES
EMERGENCY DEPARTMENT ENCOUNTER    Room Number:  14/14  PCP: Moe Haney MD  Historian: Patient  History Limited By: Nothing      HPI  Chief Complaint: Fatigue and dehydration  Context: Arpit Godoy is a 62 y.o. male who presents to the ED c/o fatigue and dehydration.  Patient states symptoms started on Thursday.  Patient states he is got dry mouth and fatigue.  Has been having muscle cramps.  Patient does work in the heat.  Has felt lightheaded today felt like he was going to pass out.  Generalized weakness.  No chest pain or shortness of breath no vomiting.  Patient has had some mild diarrhea.  Patient is on no new medications.  Patient has had no fevers or chills.      Location: Fatigue  Radiation: None  Character: Generalized  Duration: Thursday  Severity: Moderate  Progression: Unchanged  Aggravating Factors: Nothing  Alleviating Factors: Nothing        MEDICAL RECORD REVIEW    Last ER visit was March 2021          PAST MEDICAL HISTORY  Active Ambulatory Problems     Diagnosis Date Noted   • Closed fracture of ramus of right pubis (HCC) 03/13/2021   • Hypertension 03/13/2021   • ETOH abuse 03/13/2021   • History of adenomatous polyp of colon 04/16/2021     Resolved Ambulatory Problems     Diagnosis Date Noted   • No Resolved Ambulatory Problems     No Additional Past Medical History         PAST SURGICAL HISTORY  Past Surgical History:   Procedure Laterality Date   • APPENDECTOMY           FAMILY HISTORY  Family History   Problem Relation Age of Onset   • Heart disease Mother    • Heart disease Father    • Lung cancer Father 86        Liver mets. from suspected Lung Cancer   • Heart disease Sister    • Stroke Sister    • No Known Problems Other    • Cancer Cousin         Bladder Cancer         SOCIAL HISTORY  Social History     Socioeconomic History   • Marital status:    Tobacco Use   • Smoking status: Former Smoker     Packs/day: 0.50     Types: Cigarettes     Quit date: 3/6/2021     Years  since quittin.3   • Smokeless tobacco: Never Used   • Tobacco comment: Former tobacco cigarette smoker quit about 6 weeks ago.  Smoked 1ppd for 38 years and .5 ppd for 10 years for a 43 pack year history.  Currently using a vape device with nicotine and flavoring.   Vaping Use   • Vaping Use: Every day   • Substances: Nicotine, Flavoring, Reports that he is vaping less than he was smoking.   • Devices: Pre-filled or refillable cartridge   Substance and Sexual Activity   • Alcohol use: Yes     Alcohol/week: 28.0 standard drinks     Types: 28 Cans of beer per week     Comment: Reports he has cut back to 4 beers nightly.   • Drug use: Not Currently   • Sexual activity: Defer         ALLERGIES  Patient has no known allergies.        REVIEW OF SYSTEMS  Review of Systems   Constitutional: Positive for fatigue. Negative for activity change, appetite change and fever.   HENT: Negative for congestion and sore throat.    Eyes: Negative.    Respiratory: Negative for cough and shortness of breath.    Cardiovascular: Negative for chest pain and leg swelling.   Gastrointestinal: Negative for abdominal pain, diarrhea and vomiting.   Endocrine: Negative.    Genitourinary: Negative for decreased urine volume and dysuria.   Musculoskeletal: Negative for neck pain.   Skin: Negative for rash and wound.   Allergic/Immunologic: Negative.    Neurological: Positive for weakness. Negative for numbness and headaches.   Hematological: Negative.    Psychiatric/Behavioral: Negative.    All other systems reviewed and are negative.           PHYSICAL EXAM  ED Triage Vitals [22 1519]   Temp Heart Rate Resp BP SpO2   98.1 °F (36.7 °C) 98 16 -- 100 %      Temp src Heart Rate Source Patient Position BP Location FiO2 (%)   -- -- -- -- --       Physical Exam  Vitals and nursing note reviewed.   Constitutional:       General: He is not in acute distress.  HENT:      Head: Normocephalic and atraumatic.   Eyes:      Pupils: Pupils are equal,  round, and reactive to light.   Cardiovascular:      Rate and Rhythm: Normal rate and regular rhythm.      Heart sounds: Normal heart sounds.   Pulmonary:      Effort: Pulmonary effort is normal. No respiratory distress.      Breath sounds: Normal breath sounds.   Abdominal:      Palpations: Abdomen is soft.      Tenderness: There is no abdominal tenderness. There is no guarding or rebound.   Musculoskeletal:         General: Normal range of motion.      Cervical back: Normal range of motion and neck supple.   Skin:     General: Skin is warm and dry.   Neurological:      Mental Status: He is alert and oriented to person, place, and time.      Sensory: Sensation is intact. No sensory deficit.      Motor: No weakness.   Psychiatric:         Mood and Affect: Mood and affect normal.     Patient was wearing a face mask when I entered the room and they continued to wear a mask throughout their stay in the ED.  I wore PPE, including  gloves, face mask with shield or face mask with goggles whenever I was in the room with patient.         LAB RESULTS  Recent Results (from the past 24 hour(s))   Comprehensive Metabolic Panel    Collection Time: 07/05/22  7:11 PM    Specimen: Arm, Right; Blood   Result Value Ref Range    Glucose 107 (H) 65 - 99 mg/dL    BUN 17 8 - 23 mg/dL    Creatinine 1.32 (H) 0.76 - 1.27 mg/dL    Sodium 136 136 - 145 mmol/L    Potassium 3.9 3.5 - 5.2 mmol/L    Chloride 101 98 - 107 mmol/L    CO2 23.6 22.0 - 29.0 mmol/L    Calcium 9.7 8.6 - 10.5 mg/dL    Total Protein 6.6 6.0 - 8.5 g/dL    Albumin 4.60 3.50 - 5.20 g/dL    ALT (SGPT) 27 1 - 41 U/L    AST (SGOT) 18 1 - 40 U/L    Alkaline Phosphatase 85 39 - 117 U/L    Total Bilirubin 0.3 0.0 - 1.2 mg/dL    Globulin 2.0 gm/dL    A/G Ratio 2.3 g/dL    BUN/Creatinine Ratio 12.9 7.0 - 25.0    Anion Gap 11.4 5.0 - 15.0 mmol/L    eGFR 60.2 >60.0 mL/min/1.73   Troponin    Collection Time: 07/05/22  7:11 PM    Specimen: Arm, Right; Blood   Result Value Ref Range     Troponin T <0.010 0.000 - 0.030 ng/mL   Magnesium    Collection Time: 07/05/22  7:11 PM    Specimen: Arm, Right; Blood   Result Value Ref Range    Magnesium 2.0 1.6 - 2.4 mg/dL   Green Top (Gel)    Collection Time: 07/05/22  7:11 PM   Result Value Ref Range    Extra Tube Hold for add-ons.    Lavender Top    Collection Time: 07/05/22  7:11 PM   Result Value Ref Range    Extra Tube hold for add-on    Gold Top - SST    Collection Time: 07/05/22  7:11 PM   Result Value Ref Range    Extra Tube Hold for add-ons.    Light Blue Top    Collection Time: 07/05/22  7:11 PM   Result Value Ref Range    Extra Tube Hold for add-ons.    CBC Auto Differential    Collection Time: 07/05/22  7:11 PM    Specimen: Arm, Right; Blood   Result Value Ref Range    WBC 8.88 3.40 - 10.80 10*3/mm3    RBC 3.90 (L) 4.14 - 5.80 10*6/mm3    Hemoglobin 12.8 (L) 13.0 - 17.7 g/dL    Hematocrit 34.9 (L) 37.5 - 51.0 %    MCV 89.5 79.0 - 97.0 fL    MCH 32.8 26.6 - 33.0 pg    MCHC 36.7 (H) 31.5 - 35.7 g/dL    RDW 11.6 (L) 12.3 - 15.4 %    RDW-SD 36.5 (L) 37.0 - 54.0 fl    MPV 8.8 6.0 - 12.0 fL    Platelets 270 140 - 450 10*3/mm3    Neutrophil % 64.1 42.7 - 76.0 %    Lymphocyte % 21.2 19.6 - 45.3 %    Monocyte % 9.5 5.0 - 12.0 %    Eosinophil % 4.1 0.3 - 6.2 %    Basophil % 0.5 0.0 - 1.5 %    Immature Grans % 0.6 (H) 0.0 - 0.5 %    Neutrophils, Absolute 5.71 1.70 - 7.00 10*3/mm3    Lymphocytes, Absolute 1.88 0.70 - 3.10 10*3/mm3    Monocytes, Absolute 0.84 0.10 - 0.90 10*3/mm3    Eosinophils, Absolute 0.36 0.00 - 0.40 10*3/mm3    Basophils, Absolute 0.04 0.00 - 0.20 10*3/mm3    Immature Grans, Absolute 0.05 0.00 - 0.05 10*3/mm3    nRBC 0.0 0.0 - 0.2 /100 WBC   CK    Collection Time: 07/05/22  7:11 PM    Specimen: Arm, Right; Blood   Result Value Ref Range    Creatine Kinase 77 20 - 200 U/L   Urinalysis With Microscopic If Indicated (No Culture) - Urine, Clean Catch    Collection Time: 07/05/22  7:13 PM    Specimen: Urine, Clean Catch   Result Value Ref Range     Color, UA Yellow Yellow, Straw    Appearance, UA Clear Clear    pH, UA <=5.0 5.0 - 8.0    Specific Gravity, UA 1.007 1.005 - 1.030    Glucose, UA Negative Negative    Ketones, UA Negative Negative    Bilirubin, UA Negative Negative    Blood, UA Negative Negative    Protein, UA Negative Negative    Leuk Esterase, UA Negative Negative    Nitrite, UA Negative Negative    Urobilinogen, UA 0.2 E.U./dL 0.2 - 1.0 E.U./dL   ECG 12 Lead    Collection Time: 07/05/22  8:03 PM   Result Value Ref Range    QT Interval 366 ms   COVID-19,[facility] IN-HOUSE CEPHEID/ANTONIA NP SWAB IN TRANSPORT MEDIA 8-12 HR TAT - Swab, Nasopharynx    Collection Time: 07/05/22  8:56 PM    Specimen: Nasopharynx; Swab   Result Value Ref Range    COVID19 Not Detected Not Detected - Ref. Range       Ordered the above labs and reviewed the results.        RADIOLOGY  XR Chest 1 View              Ordered the above noted radiological studies. Reviewed by me in PACS.          PROCEDURES  Procedures      EKG:          EKG time: 2003  Rhythm/Rate: Normal sinus rhythm 89  P waves and [place]: Normal P waves  QRS, axis: Normal QRS incomplete right bundle  ST and T waves: Normal ST-T wave    Interpreted Contemporaneously by me, independently viewed  No prior        MEDICATIONS GIVEN IN ER  Medications   sodium chloride 0.9 % flush 10 mL (has no administration in time range)   sodium chloride 0.9 % bolus 1,000 mL (1,000 mL Intravenous New Bag 7/5/22 2055)             PROGRESS AND CONSULTS  ED Course as of 07/05/22 2232 Tue Jul 05, 2022 2217 22:17 EDT  Patient presents for evaluation because he feels dehydrated.  States his mouth is dry.  Patient does work out in the heat.  Patient's creatinine is mildly elevated.  CPK is normal.  Has been given fluids here and states he feels better.  Patient has no evidence of ischemia on his EKG with normal troponin.  Patient will be discharged home.  Encouraged to have his creatinine rechecked later this week.  Instructed to stay  out of the heat tomorrow.  Instructed to return here for worsening symptoms [SL]      ED Course User Index  [SL] John Johnson MD           MEDICAL DECISION MAKING      MDM  Number of Diagnoses or Management Options     Amount and/or Complexity of Data Reviewed  Clinical lab tests: reviewed and ordered (CK normal)  Tests in the radiology section of CPT®: reviewed and ordered (Chest x-ray negative)  Tests in the medicine section of CPT®: reviewed  Decide to obtain previous medical records or to obtain history from someone other than the patient: yes               DIAGNOSIS  Final diagnoses:   Malaise and fatigue   Myalgia   Dehydration           DISPOSITION  DISCHARGE    Patient discharged in stable condition.    Reviewed implications of results, diagnosis, meds, responsibility to follow up, warning signs and symptoms of possible worsening, potential complications and reasons to return to ER, including worsening symptoms    Patient/Family voiced understanding of above instructions.    Discussed plan for discharge, as there is no emergent indication for admission. Patient referred to primary care provider for BP management due to today's BP. Pt/family is agreeable and understands need for follow up and repeat testing.  Pt is aware that discharge does not mean that nothing is wrong but it indicates no emergency is present that requires admission and they must continue care with follow-up as given below or physician of their choice.     FOLLOW-UP  Moe Haney MD  [facility] [address]  [phone]    Schedule an appointment as soon as possible for a visit            Medication List      No changes were made to your prescriptions during this visit.             Latest Documented Vital Signs:  As of 22:32 EDT  BP- 147/86 HR- 86 Temp- 98.1 °F (36.7 °C) O2 sat- 99%                         John Johnson MD  07/05/22 3775

## 2022-07-11 ENCOUNTER — OFFICE VISIT (OUTPATIENT)
Dept: INTERNAL MEDICINE | Facility: CLINIC | Age: 62
End: 2022-07-11

## 2022-07-11 VITALS
HEART RATE: 88 BPM | HEIGHT: 70 IN | WEIGHT: 208 LBS | RESPIRATION RATE: 18 BRPM | BODY MASS INDEX: 29.78 KG/M2 | OXYGEN SATURATION: 97 % | TEMPERATURE: 98 F | DIASTOLIC BLOOD PRESSURE: 64 MMHG | SYSTOLIC BLOOD PRESSURE: 122 MMHG

## 2022-07-11 DIAGNOSIS — I95.1 ORTHOSTATIC HYPOTENSION: ICD-10-CM

## 2022-07-11 DIAGNOSIS — I10 PRIMARY HYPERTENSION: Primary | Chronic | ICD-10-CM

## 2022-07-11 PROCEDURE — 99213 OFFICE O/P EST LOW 20 MIN: CPT | Performed by: INTERNAL MEDICINE

## 2022-07-11 NOTE — PROGRESS NOTES
Subjective   Arpit Godoy is a 62 y.o. male.     Chief Complaint   Patient presents with   • Creatine Level    • Hospital Follow Up Visit         1 week ago he was having trouble with lightheadedness.  Felt to be dehydrated.  Seen in the emergency room.  Creatinine bumped to 1.32.  He was given some IV fluids and felt better.  Blood pressure is up to 110 when he left the emergency room.  He felt like he was dehydrated because working out in the heat and the previous.       The following portions of the patient's history were reviewed and updated as appropriate: allergies, current medications, past social history and problem list.    Outpatient Medications Marked as Taking for the 7/11/22 encounter (Office Visit) with Moe Haney MD   Medication Sig Dispense Refill   • aspirin 81 MG chewable tablet Chew 81 mg Daily.     • CALCIUM-VITAMIN D PO Take  by mouth.     • Cholecalciferol (Vitamin D3) 50 MCG (2000 UT) capsule Take  by mouth.     • Multiple Vitamins-Calcium (DAILY COMBO MULTIVITS/CALCIUM PO) Take  by mouth.     • valsartan-hydrochlorothiazide (DIOVAN-HCT) 160-12.5 MG per tablet TAKE 1 TABLET BY MOUTH DAILY 90 tablet 1       Review of Systems   Constitutional: Negative for chills and fever.   Respiratory: Negative for shortness of breath.    Neurological: Negative for light-headedness and headaches.       Objective   Vitals:    07/11/22 1503   BP: 122/64   Pulse: 88   Resp: 18   Temp: 98 °F (36.7 °C)   SpO2: 97%      Wt Readings from Last 3 Encounters:   07/11/22 94.3 kg (208 lb)   04/29/22 97.2 kg (214 lb 3.2 oz)   10/29/21 100 kg (221 lb)    Body mass index is 29.84 kg/m².      Physical Exam  Constitutional:       Appearance: Normal appearance. He is well-developed.   Neck:      Thyroid: No thyromegaly.   Cardiovascular:      Rate and Rhythm: Normal rate and regular rhythm.      Heart sounds: Normal heart sounds. No murmur heard.    No gallop.   Pulmonary:      Effort: Pulmonary effort is  normal. No respiratory distress.      Breath sounds: Normal breath sounds. No wheezing or rales.   Neurological:      Mental Status: He is alert.           Problems Addressed this Visit        Cardiac and Vasculature    Hypertension - Primary (Chronic)      Diagnoses       Codes Comments    Primary hypertension    -  Primary ICD-10-CM: I10  ICD-9-CM: 401.9         Assessment & Plan   In today for follow-up from the emergency room 6 days ago when he presented with orthostasis.  He has been doing a lot of heavy work in the hot weather.  Blood pressure is low upon arrival.  Given some IV fluids and he improved.  Was told that he was dehydrated.  His creatinine did bump to 1.32.  Nonetheless his hemoglobin was down and his urine specific gravity was low.  Likely he was not dehydrated but clearly was hypotensive.  For now he is cutting his Diovan HCT in half to 80/6.25 mg daily.  He has had no further symptoms since being in the emergency room.  We will check a CBC and a BMP today.  Emergency room visit and the emergency room lab work from 7/5/2022 including CBC, CMP, CPK, troponin including EKG all reviewed today.    The above information was reviewed again today 07/11/22.  It continues to be accurate as reflected above and is unchanged.  History, physical and review of systems all reviewed and are unchanged.  Medications were reviewed today and continue the current dosing.    PPE today includes face mask and eye shield.             Dragon disclaimer:   Much of this encounter note is an electronic transcription/translation of spoken language to printed text. The electronic translation of spoken language may permit erroneous, or at times, nonsensical words or phrases to be inadvertently transcribed; Although I have reviewed the note for such errors, some may still exist.

## 2022-07-12 LAB
BASOPHILS # BLD AUTO: 0.1 X10E3/UL (ref 0–0.2)
BASOPHILS NFR BLD AUTO: 1 %
BUN SERPL-MCNC: 13 MG/DL (ref 8–27)
BUN/CREAT SERPL: 14 (ref 10–24)
CALCIUM SERPL-MCNC: 9.6 MG/DL (ref 8.6–10.2)
CHLORIDE SERPL-SCNC: 103 MMOL/L (ref 96–106)
CO2 SERPL-SCNC: 22 MMOL/L (ref 20–29)
CREAT SERPL-MCNC: 0.95 MG/DL (ref 0.76–1.27)
EGFRCR SERPLBLD CKD-EPI 2021: 89 ML/MIN/1.73
EOSINOPHIL # BLD AUTO: 0.7 X10E3/UL (ref 0–0.4)
EOSINOPHIL NFR BLD AUTO: 6 %
ERYTHROCYTE [DISTWIDTH] IN BLOOD BY AUTOMATED COUNT: 11.4 % (ref 11.6–15.4)
GLUCOSE SERPL-MCNC: 97 MG/DL (ref 65–99)
HCT VFR BLD AUTO: 36.2 % (ref 37.5–51)
HGB BLD-MCNC: 12.2 G/DL (ref 13–17.7)
IMM GRANULOCYTES # BLD AUTO: 0.1 X10E3/UL (ref 0–0.1)
IMM GRANULOCYTES NFR BLD AUTO: 1 %
LYMPHOCYTES # BLD AUTO: 2.2 X10E3/UL (ref 0.7–3.1)
LYMPHOCYTES NFR BLD AUTO: 20 %
MCH RBC QN AUTO: 31 PG (ref 26.6–33)
MCHC RBC AUTO-ENTMCNC: 33.7 G/DL (ref 31.5–35.7)
MCV RBC AUTO: 92 FL (ref 79–97)
MONOCYTES # BLD AUTO: 0.9 X10E3/UL (ref 0.1–0.9)
MONOCYTES NFR BLD AUTO: 8 %
NEUTROPHILS # BLD AUTO: 7.3 X10E3/UL (ref 1.4–7)
NEUTROPHILS NFR BLD AUTO: 64 %
PLATELET # BLD AUTO: 439 X10E3/UL (ref 150–450)
POTASSIUM SERPL-SCNC: 4.4 MMOL/L (ref 3.5–5.2)
RBC # BLD AUTO: 3.94 X10E6/UL (ref 4.14–5.8)
SODIUM SERPL-SCNC: 142 MMOL/L (ref 134–144)
WBC # BLD AUTO: 11.2 X10E3/UL (ref 3.4–10.8)

## 2022-09-26 RX ORDER — VALSARTAN AND HYDROCHLOROTHIAZIDE 160; 12.5 MG/1; MG/1
1 TABLET, FILM COATED ORAL DAILY
Qty: 90 TABLET | Refills: 1 | Status: SHIPPED | OUTPATIENT
Start: 2022-09-26

## 2022-10-27 ENCOUNTER — OFFICE VISIT (OUTPATIENT)
Dept: INTERNAL MEDICINE | Facility: CLINIC | Age: 62
End: 2022-10-27

## 2022-10-27 VITALS
TEMPERATURE: 97.8 F | WEIGHT: 210 LBS | HEIGHT: 70 IN | HEART RATE: 103 BPM | DIASTOLIC BLOOD PRESSURE: 74 MMHG | RESPIRATION RATE: 18 BRPM | BODY MASS INDEX: 30.06 KG/M2 | OXYGEN SATURATION: 98 % | SYSTOLIC BLOOD PRESSURE: 132 MMHG

## 2022-10-27 DIAGNOSIS — D64.9 ANEMIA, UNSPECIFIED TYPE: ICD-10-CM

## 2022-10-27 DIAGNOSIS — Z86.010 HISTORY OF ADENOMATOUS POLYP OF COLON: ICD-10-CM

## 2022-10-27 DIAGNOSIS — Z12.2 SCREENING FOR LUNG CANCER: ICD-10-CM

## 2022-10-27 DIAGNOSIS — I49.9 IRREGULAR HEARTBEAT: ICD-10-CM

## 2022-10-27 DIAGNOSIS — I10 PRIMARY HYPERTENSION: Primary | Chronic | ICD-10-CM

## 2022-10-27 PROBLEM — Z86.0101 HISTORY OF ADENOMATOUS POLYP OF COLON: Chronic | Status: ACTIVE | Noted: 2021-04-16

## 2022-10-27 PROCEDURE — 99214 OFFICE O/P EST MOD 30 MIN: CPT | Performed by: INTERNAL MEDICINE

## 2022-10-27 PROCEDURE — 93000 ELECTROCARDIOGRAM COMPLETE: CPT | Performed by: INTERNAL MEDICINE

## 2022-10-27 NOTE — PROGRESS NOTES
Subjective   Arpit Godoy is a 62 y.o. male.     Chief Complaint   Patient presents with   • Hypertension         Hypertension  This is a chronic problem. The current episode started more than 1 year ago. The problem is unchanged. Pertinent negatives include no chest pain, palpitations or shortness of breath.        The following portions of the patient's history were reviewed and updated as appropriate: allergies, current medications, past social history and problem list.    Outpatient Medications Marked as Taking for the 10/27/22 encounter (Office Visit) with Moe Haney MD   Medication Sig Dispense Refill   • aspirin 81 MG chewable tablet Chew 81 mg Daily.     • Cholecalciferol (Vitamin D3) 50 MCG (2000 UT) capsule Take  by mouth.     • Multiple Vitamins-Calcium (DAILY COMBO MULTIVITS/CALCIUM PO) Take  by mouth.     • valsartan-hydrochlorothiazide (DIOVAN-HCT) 160-12.5 MG per tablet TAKE 1 TABLET BY MOUTH DAILY 90 tablet 1       Review of Systems   Respiratory: Negative for shortness of breath and wheezing.    Cardiovascular: Negative for chest pain, palpitations and leg swelling.   Gastrointestinal: Positive for diarrhea ( occassional). Negative for constipation.   Allergic/Immunologic: Positive for environmental allergies.       Objective   Vitals:    10/27/22 1456   BP: 132/74   Pulse: 103   Resp: 18   Temp: 97.8 °F (36.6 °C)   SpO2: 98%      Wt Readings from Last 3 Encounters:   10/27/22 95.3 kg (210 lb)   07/11/22 94.3 kg (208 lb)   04/29/22 97.2 kg (214 lb 3.2 oz)    Body mass index is 30.13 kg/m².      Physical Exam  Constitutional:       Appearance: Normal appearance.   Cardiovascular:      Rate and Rhythm: Normal rate. Rhythm irregular.      Heart sounds: Murmur heard.    Crescendo decrescendo systolic murmur is present with a grade of 2/6.    No gallop.      Comments: Grade 2 MIKI throughout  Pulmonary:      Effort: Pulmonary effort is normal. No respiratory distress.      Breath sounds:  Normal breath sounds. No wheezing or rales.   Neurological:      Mental Status: He is alert.         ECG 12 Lead    Date/Time: 10/27/2022 4:08 PM  Performed by: Moe Haney MD  Authorized by: Moe Haney MD   Comparison: compared with previous ECG from 7/5/2022  Similar to previous ECG  Rhythm: sinus rhythm  Ectopy: atrial premature contractions  Rate: normal  Conduction: incomplete right bundle branch block  ST Segments: ST segments normal  T Waves: T waves normal  QRS axis: normal  Other: no other findings    Clinical impression: normal ECG  Comments: Normal sinus rhythm.  Heart rate is 91.  There is an incomplete right bundle branch block.  PACs are present.  This is a near normal EKG.  There is no change from the prior EKG dated 7/5/2022.                Problems Addressed this Visit        Cardiac and Vasculature    Hypertension - Primary (Chronic)   Diagnoses       Codes Comments    Primary hypertension    -  Primary ICD-10-CM: I10  ICD-9-CM: 401.9         Assessment & Plan   He has a history of hypertension and adenomatous colon polyps.  He does have a chronic smoking history as well as some alcohol excess.  Averages about 4 beers per day and is now trying to cut that down.  He has a 43-pack-year smoking history.  Trying to quit right now.  He had comprehensive lab work in the emergency room July 2022 with a CBC and CMP.  Comprehensive lab work was done April 2022.  We will check a lipid profile, A1c, PSA antibody 4/2023.  He is very much against any type of immunizations because he had some side effects to flu shots in the past.  He might consider them in the future but will not today.  Blood pressures had been running excellent.  We cut his medicine in half previously but he is back to full dose now.  He is due for his annual low-dose CT scan for lung cancer screening.  Due for 5-year colonoscopy for history of adenomatous colon polyps.  He has developed a mild anemia over the past 2 years and MCV is  dropping ever so slightly.  We will repeat CBC today along with some iron studies, B12 and folic acid.  We will move out to 6 months on visits.    The above information was reviewed again today 10/27/22.  It continues to be accurate as reflected above and is unchanged.  History, physical and review of systems all reviewed and are unchanged.  Medications were reviewed today and continue the current dosing.    PPE today includes face mask and eye shield.         Dragon disclaimer:   Much of this encounter note is an electronic transcription/translation of spoken language to printed text. The electronic translation of spoken language may permit erroneous, or at times, nonsensical words or phrases to be inadvertently transcribed; Although I have reviewed the note for such errors, some may still exist.

## 2022-10-28 PROBLEM — D64.89 OTHER SPECIFIED ANEMIAS: Status: ACTIVE | Noted: 2022-10-28

## 2022-10-28 LAB
BASOPHILS # BLD AUTO: 0.03 10*3/MM3 (ref 0–0.2)
BASOPHILS NFR BLD AUTO: 0.3 % (ref 0–1.5)
EOSINOPHIL # BLD AUTO: 0.26 10*3/MM3 (ref 0–0.4)
EOSINOPHIL NFR BLD AUTO: 2.9 % (ref 0.3–6.2)
ERYTHROCYTE [DISTWIDTH] IN BLOOD BY AUTOMATED COUNT: 11.6 % (ref 12.3–15.4)
FERRITIN SERPL-MCNC: 450 NG/ML (ref 30–400)
FOLATE SERPL-MCNC: 16.3 NG/ML (ref 4.78–24.2)
HCT VFR BLD AUTO: 36.7 % (ref 37.5–51)
HGB BLD-MCNC: 13 G/DL (ref 13–17.7)
IMM GRANULOCYTES # BLD AUTO: 0.04 10*3/MM3 (ref 0–0.05)
IMM GRANULOCYTES NFR BLD AUTO: 0.4 % (ref 0–0.5)
IRON SATN MFR SERPL: 45 % (ref 20–50)
IRON SERPL-MCNC: 159 MCG/DL (ref 59–158)
LYMPHOCYTES # BLD AUTO: 2.05 10*3/MM3 (ref 0.7–3.1)
LYMPHOCYTES NFR BLD AUTO: 22.6 % (ref 19.6–45.3)
Lab: NORMAL
MCH RBC QN AUTO: 31.6 PG (ref 26.6–33)
MCHC RBC AUTO-ENTMCNC: 35.4 G/DL (ref 31.5–35.7)
MCV RBC AUTO: 89.3 FL (ref 79–97)
MONOCYTES # BLD AUTO: 0.77 10*3/MM3 (ref 0.1–0.9)
MONOCYTES NFR BLD AUTO: 8.5 % (ref 5–12)
NEUTROPHILS # BLD AUTO: 5.94 10*3/MM3 (ref 1.7–7)
NEUTROPHILS NFR BLD AUTO: 65.3 % (ref 42.7–76)
NRBC BLD AUTO-RTO: 0 /100 WBC (ref 0–0.2)
PLATELET # BLD AUTO: 263 10*3/MM3 (ref 140–450)
RBC # BLD AUTO: 4.11 10*6/MM3 (ref 4.14–5.8)
TIBC SERPL-MCNC: 355 MCG/DL
TSH SERPL DL<=0.005 MIU/L-ACNC: 2.94 UIU/ML (ref 0.27–4.2)
UIBC SERPL-MCNC: 196 MCG/DL (ref 112–346)
VIT B12 SERPL-MCNC: 311 PG/ML (ref 211–946)
WBC # BLD AUTO: 9.09 10*3/MM3 (ref 3.4–10.8)
WRITTEN AUTHORIZATION: NORMAL

## 2022-12-13 ENCOUNTER — PRE-PROCEDURE SCREENING (OUTPATIENT)
Dept: GASTROENTEROLOGY | Facility: CLINIC | Age: 62
End: 2022-12-13

## 2022-12-13 NOTE — TELEPHONE ENCOUNTER
Last scope 8/17 in epic --Personal history of polyps--Family history of polyps and  colon cancer--ASPIRIN--Medications:            aspirin 81 MG chewable tablet  Cholecalciferol (Vitamin D3) 50 MCG (2000 UT) capsule  Multiple Vitamins-Calcium (DAILY COMBO MULTIVITS/CALCIUM PO)  valsartan-hydrochlorothiazide (DIOVAN-HCT) 160-12.5 MG per tablet                  QUESTIONNAIRE SCEEENING & HAS BEEN SENT TO DOCTOR FOR REVIEW

## 2022-12-14 ENCOUNTER — PREP FOR SURGERY (OUTPATIENT)
Dept: OTHER | Facility: HOSPITAL | Age: 62
End: 2022-12-14

## 2022-12-14 DIAGNOSIS — K63.5 COLON POLYPS: Primary | ICD-10-CM

## 2022-12-15 ENCOUNTER — HOSPITAL ENCOUNTER (OUTPATIENT)
Dept: CT IMAGING | Facility: HOSPITAL | Age: 62
Discharge: HOME | End: 2022-12-15
Admitting: INTERNAL MEDICINE

## 2022-12-15 DIAGNOSIS — Z12.2 SCREENING FOR LUNG CANCER: ICD-10-CM

## 2022-12-15 PROCEDURE — 71271 CT THORAX LUNG CANCER SCR C-: CPT

## 2023-02-03 ENCOUNTER — TELEPHONE (OUTPATIENT)
Dept: GASTROENTEROLOGY | Facility: CLINIC | Age: 63
End: 2023-02-03
Payer: COMMERCIAL

## 2023-02-07 ENCOUNTER — TELEPHONE (OUTPATIENT)
Dept: GASTROENTEROLOGY | Facility: CLINIC | Age: 63
End: 2023-02-07

## 2023-02-07 NOTE — TELEPHONE ENCOUNTER
Caller: Arpit Godoy    Relationship to patient: Self    Best call back number: [phone]    Patient is needing: PATIENT MISSED  CALL AND IS REQUESTING CALL BACK.

## 2023-03-28 ENCOUNTER — TELEPHONE (OUTPATIENT)
Dept: GASTROENTEROLOGY | Facility: CLINIC | Age: 63
End: 2023-03-28
Payer: COMMERCIAL

## 2023-03-28 PROBLEM — K63.5 COLON POLYPS: Status: ACTIVE | Noted: 2023-03-28

## 2023-03-28 NOTE — TELEPHONE ENCOUNTER
WESLEY BALTAZAR   for colonoscopy on 07/28/23   arrive at  800AM  . Mailed Prep instructions to Mailing address on-file. ----[employer]

## 2023-04-17 RX ORDER — VALSARTAN AND HYDROCHLOROTHIAZIDE 160; 12.5 MG/1; MG/1
1 TABLET, FILM COATED ORAL DAILY
Qty: 90 TABLET | Refills: 1 | Status: SHIPPED | OUTPATIENT
Start: 2023-04-17

## 2023-05-09 ENCOUNTER — OFFICE VISIT (OUTPATIENT)
Dept: INTERNAL MEDICINE | Facility: CLINIC | Age: 63
End: 2023-05-09
Payer: COMMERCIAL

## 2023-05-09 VITALS
OXYGEN SATURATION: 98 % | HEIGHT: 70 IN | TEMPERATURE: 98.2 F | SYSTOLIC BLOOD PRESSURE: 138 MMHG | BODY MASS INDEX: 30.13 KG/M2 | DIASTOLIC BLOOD PRESSURE: 80 MMHG | HEART RATE: 78 BPM | RESPIRATION RATE: 18 BRPM

## 2023-05-09 DIAGNOSIS — I10 PRIMARY HYPERTENSION: Chronic | ICD-10-CM

## 2023-05-09 DIAGNOSIS — Z12.5 SCREENING FOR PROSTATE CANCER: ICD-10-CM

## 2023-05-09 DIAGNOSIS — Z00.00 ENCOUNTER FOR PREVENTIVE HEALTH EXAMINATION: Primary | ICD-10-CM

## 2023-05-09 PROBLEM — S32.591A CLOSED FRACTURE OF RAMUS OF RIGHT PUBIS: Status: RESOLVED | Noted: 2021-03-13 | Resolved: 2023-05-09

## 2023-05-09 PROCEDURE — 99396 PREV VISIT EST AGE 40-64: CPT | Performed by: INTERNAL MEDICINE

## 2023-05-09 RX ORDER — FLUTICASONE PROPIONATE 50 MCG
2 SPRAY, SUSPENSION (ML) NASAL DAILY
COMMUNITY

## 2023-05-09 RX ORDER — CETIRIZINE HYDROCHLORIDE 5 MG/1
5 TABLET ORAL DAILY
COMMUNITY

## 2023-05-09 NOTE — PROGRESS NOTES
Subjective   Arpit Godoy is a 62 y.o. male.     Chief Complaint   Patient presents with   • Annual Exam         History of Present Illness  In for annual preventive exam.  Sleeps about 6 to 7 hours per night on average.  No formal exercise.  Energy is pretty good.  Diet is pretty good.  Hypertension  This is a chronic problem. The current episode started more than 1 year ago. The problem is unchanged. Pertinent negatives include no chest pain, palpitations or shortness of breath.        The following portions of the patient's history were reviewed and updated as appropriate: allergies, current medications, past social history and problem list.    Outpatient Medications Marked as Taking for the 5/9/23 encounter (Office Visit) with Moe Haney MD   Medication Sig Dispense Refill   • cetirizine (zyrTEC) 5 MG tablet Take 1 tablet by mouth Daily.     • fluticasone (FLONASE) 50 MCG/ACT nasal spray 2 sprays into the nostril(s) as directed by provider Daily.     • valsartan-hydrochlorothiazide (DIOVAN-HCT) 160-12.5 MG per tablet TAKE 1 TABLET BY MOUTH DAILY 90 tablet 1       Review of Systems   Constitutional: Negative for chills, diaphoresis, fatigue and unexpected weight change.   Respiratory: Negative for cough, chest tightness, shortness of breath and wheezing.    Cardiovascular: Negative for chest pain, palpitations and leg swelling.   Gastrointestinal: Positive for diarrhea. Negative for anal bleeding and constipation.   Endocrine: Negative for cold intolerance, heat intolerance and polyuria.   Genitourinary: Negative for difficulty urinating, dysuria, frequency and urgency.   Musculoskeletal: Negative for arthralgias ( hands), back pain and myalgias.   Allergic/Immunologic: Positive for environmental allergies.   Neurological: Negative for dizziness, syncope, weakness and light-headedness.   Hematological: Negative for adenopathy. Does not bruise/bleed easily.   Psychiatric/Behavioral: Negative for  confusion, dysphoric mood and sleep disturbance. The patient is not nervous/anxious.        Objective   Vitals:    05/09/23 1514   BP: 138/80   Pulse: 78   Resp: 18   Temp: 98.2 °F (36.8 °C)   SpO2: 98%      Wt Readings from Last 3 Encounters:   10/27/22 95.3 kg (210 lb)   07/11/22 94.3 kg (208 lb)   04/29/22 97.2 kg (214 lb 3.2 oz)    Body mass index is 30.13 kg/m².      Physical Exam  Vitals and nursing note reviewed.   Constitutional:       Appearance: Normal appearance. He is well-developed.   HENT:      Right Ear: External ear normal.      Left Ear: External ear normal.      Nose: Nose normal.   Eyes:      General: No scleral icterus.     Extraocular Movements: Extraocular movements intact.      Conjunctiva/sclera: Conjunctivae normal.      Pupils: Pupils are equal, round, and reactive to light.   Neck:      Thyroid: No thyromegaly.      Vascular: No JVD.   Cardiovascular:      Rate and Rhythm: Normal rate and regular rhythm.      Heart sounds: Murmur heard.    Systolic murmur is present with a grade of 2/6.    No friction rub. No gallop.      Comments: Grade 1-2 systolic murmur along the LSB  Pulmonary:      Effort: Pulmonary effort is normal. No respiratory distress.      Breath sounds: Normal breath sounds. No wheezing or rales.   Abdominal:      General: Bowel sounds are normal. There is no distension.      Palpations: Abdomen is soft. There is no mass.      Tenderness: There is no abdominal tenderness. There is no guarding or rebound.      Hernia: No hernia is present. There is no hernia in the left inguinal area or right inguinal area.   Genitourinary:     Testes: Normal.      Prostate: Normal. Not enlarged and no nodules present.      Rectum: Normal.   Musculoskeletal:         General: Normal range of motion.      Cervical back: Normal range of motion and neck supple.   Lymphadenopathy:      Cervical: No cervical adenopathy.   Skin:     General: Skin is warm and dry.   Neurological:      General: No  focal deficit present.      Mental Status: He is alert and oriented to person, place, and time.      Deep Tendon Reflexes: Reflexes are normal and symmetric. Reflexes normal.   Psychiatric:         Mood and Affect: Mood normal.         Behavior: Behavior normal.         Thought Content: Thought content normal.         Judgment: Judgment normal.           Problems Addressed this Visit        Cardiac and Vasculature    Hypertension (Chronic)   Other Visit Diagnoses     Encounter for preventive health examination    -  Primary      Diagnoses       Codes Comments    Encounter for preventive health examination    -  Primary ICD-10-CM: Z00.00  ICD-9-CM: V70.0     Primary hypertension     ICD-10-CM: I10  ICD-9-CM: 401.9         Assessment & Plan   In for annual preventive exam today May 2023.  Overall health has been pretty good.  He has a history of hypertension and adenomatous colon polyps.  He does have a chronic smoking history as well as some alcohol excess.  Averages about 6 beers per day.  He has a 44-pack-year smoking history.  He is due for colonoscopy and that is scheduled July 2023.  He had comprehensive lab work in July 2023 with a CBC and CMP.  We will plan annual lab work in 6 months, November 2023 including CBC, CMP, lipids, PSA, UA.  He is pretty much against any type of immunizations because he had some side effects to flu shots in the past.  He might consider them in the future but will not today.  He had low-dose CT of chest for lung cancer screening on 12/15/2022.  Stopped vaping but still smoking cigarettes at the same amount.  He also needs to cut down on his alcohol intake.  Should be drinking no more than 1 or 2 beers per day if not 0.    Prevention counseling was performed today. The counseling performed was routine health maintenance topics including BMI and exercise.    The above information was reviewed again today 05/09/23.  It continues to be accurate as reflected above and is unchanged.   History, physical and review of systems all reviewed and are unchanged.  Medications were reviewed today and continue the current dosing.    PPE today includes face mask and eye shield.             Dragon disclaimer:   Much of this encounter note is an electronic transcription/translation of spoken language to printed text. The electronic translation of spoken language may permit erroneous, or at times, nonsensical words or phrases to be inadvertently transcribed; Although I have reviewed the note for such errors, some may still exist.

## 2023-05-10 ENCOUNTER — PATIENT ROUNDING (BHMG ONLY) (OUTPATIENT)
Dept: INTERNAL MEDICINE | Facility: CLINIC | Age: 63
End: 2023-05-10
Payer: COMMERCIAL

## 2023-05-10 NOTE — PROGRESS NOTES
May 10, 2023    Hello, may I speak with Arpit Godoy?    My name is Nicole      I am  with [facility]  [facility]  [facility] [address] HEAL[address] [address] [phone].    Before we get started may I verify your date of birth? 1960    I am calling to officially welcome you to our practice and ask about your recent visit. Is this a good time to talk? no    Tell me about your visit with us. What things went well? no- Unable to speak due to being at work. Advise to call us if need anything at all.       We're always looking for ways to make our patients' experiences even better. Do you have recommendations on ways we may improve?  no- Unable to speak due to being at work. Advise to call us if need anything at all.    Overall were you satisfied with your first visit to our practice? no- Unable to speak due to being at work. Advise to call us if need anything at all.       I appreciate you taking the time to speak with me today. Is there anything else I can do for you? no- Unable to speak due to being at work. Advise to call us if need anything at all.      Thank you, and have a great day.

## 2023-07-28 ENCOUNTER — ANESTHESIA (OUTPATIENT)
Dept: GASTROENTEROLOGY | Facility: HOSPITAL | Age: 63
End: 2023-07-28
Payer: MEDICARE

## 2023-07-28 ENCOUNTER — HOSPITAL ENCOUNTER (OUTPATIENT)
Facility: HOSPITAL | Age: 63
Setting detail: HOSPITAL OUTPATIENT SURGERY
Discharge: HOME | End: 2023-07-28
Attending: INTERNAL MEDICINE | Admitting: INTERNAL MEDICINE
Payer: MEDICARE

## 2023-07-28 ENCOUNTER — ANESTHESIA EVENT (OUTPATIENT)
Dept: GASTROENTEROLOGY | Facility: HOSPITAL | Age: 63
End: 2023-07-28
Payer: MEDICARE

## 2023-07-28 VITALS
WEIGHT: 201 LBS | BODY MASS INDEX: 28.77 KG/M2 | HEIGHT: 70 IN | HEART RATE: 80 BPM | SYSTOLIC BLOOD PRESSURE: 124 MMHG | RESPIRATION RATE: 16 BRPM | DIASTOLIC BLOOD PRESSURE: 93 MMHG | OXYGEN SATURATION: 97 %

## 2023-07-28 DIAGNOSIS — K63.5 COLON POLYPS: ICD-10-CM

## 2023-07-28 PROCEDURE — S0260 H&P FOR SURGERY: HCPCS | Performed by: INTERNAL MEDICINE

## 2023-07-28 PROCEDURE — 88305 TISSUE EXAM BY PATHOLOGIST: CPT | Performed by: INTERNAL MEDICINE

## 2023-07-28 PROCEDURE — 25010000002 PROPOFOL 1000 MG/100ML EMULSION: Performed by: ANESTHESIOLOGY

## 2023-07-28 PROCEDURE — 45385 COLONOSCOPY W/LESION REMOVAL: CPT | Performed by: INTERNAL MEDICINE

## 2023-07-28 PROCEDURE — 25010000002 PHENYLEPHRINE 10 MG/ML SOLUTION: Performed by: ANESTHESIOLOGY

## 2023-07-28 PROCEDURE — 25010000002 PROPOFOL 10 MG/ML EMULSION: Performed by: ANESTHESIOLOGY

## 2023-07-28 RX ORDER — LIDOCAINE HYDROCHLORIDE 20 MG/ML
INJECTION, SOLUTION INFILTRATION; PERINEURAL AS NEEDED
Status: DISCONTINUED | OUTPATIENT
Start: 2023-07-28 | End: 2023-07-28 | Stop reason: SURG

## 2023-07-28 RX ORDER — PROPOFOL 10 MG/ML
INJECTION, EMULSION INTRAVENOUS AS NEEDED
Status: DISCONTINUED | OUTPATIENT
Start: 2023-07-28 | End: 2023-07-28 | Stop reason: SURG

## 2023-07-28 RX ORDER — PHENYLEPHRINE HYDROCHLORIDE 10 MG/ML
INJECTION INTRAVENOUS AS NEEDED
Status: DISCONTINUED | OUTPATIENT
Start: 2023-07-28 | End: 2023-07-28 | Stop reason: SURG

## 2023-07-28 RX ORDER — SODIUM CHLORIDE 0.9 % (FLUSH) 0.9 %
10 SYRINGE (ML) INJECTION AS NEEDED
Status: DISCONTINUED | OUTPATIENT
Start: 2023-07-28 | End: 2023-07-28 | Stop reason: HOSPADM

## 2023-07-28 RX ORDER — LIDOCAINE HYDROCHLORIDE 10 MG/ML
0.5 INJECTION, SOLUTION INFILTRATION; PERINEURAL ONCE AS NEEDED
Status: DISCONTINUED | OUTPATIENT
Start: 2023-07-28 | End: 2023-07-28 | Stop reason: HOSPADM

## 2023-07-28 RX ORDER — SODIUM CHLORIDE, SODIUM LACTATE, POTASSIUM CHLORIDE, CALCIUM CHLORIDE 600; 310; 30; 20 MG/100ML; MG/100ML; MG/100ML; MG/100ML
1000 INJECTION, SOLUTION INTRAVENOUS CONTINUOUS
Status: DISCONTINUED | OUTPATIENT
Start: 2023-07-28 | End: 2023-07-28 | Stop reason: HOSPADM

## 2023-07-28 RX ADMIN — LIDOCAINE HYDROCHLORIDE 60 MG: 20 INJECTION, SOLUTION INFILTRATION; PERINEURAL at 09:11

## 2023-07-28 RX ADMIN — PROPOFOL INJECTABLE EMULSION 150 MG: 10 INJECTION, EMULSION INTRAVENOUS at 09:11

## 2023-07-28 RX ADMIN — SODIUM CHLORIDE, POTASSIUM CHLORIDE, SODIUM LACTATE AND CALCIUM CHLORIDE 1000 ML: 600; 310; 30; 20 INJECTION, SOLUTION INTRAVENOUS at 08:45

## 2023-07-28 RX ADMIN — PHENYLEPHRINE HYDROCHLORIDE 150 MCG: 10 INJECTION INTRAVENOUS at 09:30

## 2023-07-28 RX ADMIN — PROPOFOL 200 MCG/KG/MIN: 10 INJECTION, EMULSION INTRAVENOUS at 09:11

## 2023-07-28 NOTE — H&P
"[facility]  Pre Procedure History & Physical    Chief Complaint:   Time for my colonoscopy    Subjective     HPI:   63 y.o. male presenting to endoscopy unit today for surveillance colonoscopy.    Past Medical History:   Past Medical History:   Diagnosis Date    Hypertension        Family History:  Family History   Problem Relation Age of Onset    Heart disease Mother     Heart disease Father     Lung cancer Father 86        Liver mets. from suspected Lung Cancer    Heart disease Sister     Stroke Sister     No Known Problems Other     Cancer Cousin         Bladder Cancer       Social History:   reports that he has been smoking cigarettes. He has been smoking an average of .5 packs per day. He has never used smokeless tobacco. He reports current alcohol use of about 28.0 standard drinks per week. He reports that he does not currently use drugs.    Medications:   Medications Prior to Admission   Medication Sig Dispense Refill Last Dose    cetirizine (zyrTEC) 5 MG tablet Take 1 tablet by mouth Daily.   Past Month    fluticasone (FLONASE) 50 MCG/ACT nasal spray 2 sprays into the nostril(s) as directed by provider Daily.   Past Month    valsartan-hydrochlorothiazide (DIOVAN-HCT) 160-12.5 MG per tablet TAKE 1 TABLET BY MOUTH DAILY 90 tablet 1 7/28/2023       Allergies:  Patient has no known allergies.    Objective     Blood pressure 129/80, pulse 80, resp. rate 16, height 177.8 cm (70\"), weight 91.2 kg (201 lb), SpO2 97 %.  Physical Exam:   General: patient awake, alert and cooperative    Assessment & Plan     Diagnosis:  Personal hx of colon polyps    Anticipated Surgical Procedure:  Colonoscopy    The risks, benefits, and alternatives of this procedure have been discussed with the patient or the responsible party- the patient understands and agrees to proceed.                                                                 "

## 2023-07-28 NOTE — DISCHARGE INSTRUCTIONS
For the next 24 hours patient needs to be with a responsible adult.    For 24 hours DO NOT drive, operate machinery, appliances, drink alcohol, make important decisions or sign legal documents.    Start with a light or bland diet if you are feeling sick to your stomach otherwise advance to regular diet as tolerated.    Follow recommendations on procedure report if provided by your doctor.    Call Dr Arana for problems [phone]. Await pathology result in 7-10 days.    Problems may include but not limited to: large amounts of bleeding, trouble breathing, repeated vomiting, severe unrelieved pain, fever or chills.

## 2023-07-28 NOTE — ANESTHESIA POSTPROCEDURE EVALUATION
"Patient: Arpit Godoy    Procedure Summary       Date: 07/28/23 Room / Location: [facility] ENDOSCOPY 8 / [facility] ENDOSCOPY    Anesthesia Start: 0905 Anesthesia Stop: 0935    Procedure: COLONOSCOPY into cecum with cold snare polypectomies Diagnosis:       Colon polyps      (Colon polyps [K63.5])    Surgeons: Mohan Arana MD Provider: Lance Jackson DO    Anesthesia Type: MAC ASA Status: 2            Anesthesia Type: MAC    Vitals  Vitals Value Taken Time   /66 07/28/23 0944   Temp     Pulse 92 07/28/23 0944   Resp 16 07/28/23 0944   SpO2 99 % 07/28/23 0944           Post Anesthesia Care and Evaluation    Patient location during evaluation: bedside  Patient participation: complete - patient participated  Level of consciousness: awake and alert  Pain management: adequate    Airway patency: patent  Anesthetic complications: No anesthetic complications  PONV Status: controlled  Cardiovascular status: acceptable and hemodynamically stable  Respiratory status: acceptable, spontaneous ventilation and nonlabored ventilation  Hydration status: acceptable    Comments: /66 (BP Location: Left arm, Patient Position: Lying)   Pulse 92   Resp 16   Ht 177.8 cm (70\")   Wt 91.2 kg (201 lb)   SpO2 99%   BMI 28.84 kg/m²       "

## 2023-07-28 NOTE — ANESTHESIA PREPROCEDURE EVALUATION
Anesthesia Evaluation     Patient summary reviewed   no history of anesthetic complications:   NPO Solid Status: > 8 hours  NPO Liquid Status: > 2 hours           Airway   Mallampati: II  TM distance: >3 FB  Neck ROM: full  No difficulty expected  Dental - normal exam     Pulmonary     breath sounds clear to auscultation  (+) a smoker Former,  (-) shortness of breath, recent URI  Cardiovascular   Exercise tolerance: good (4-7 METS)    Rhythm: regular  Rate: normal    (+) hypertension  (-) past MI, dysrhythmias, angina      Neuro/Psych  (-) seizures, CVA  GI/Hepatic/Renal/Endo    (+) obesity  (-) no renal disease, diabetes    ROS Comment: Hx of colon polyps    Musculoskeletal     (-) neck stiffness  Abdominal    Substance History   (+) alcohol use (~6 beers/day reported)     OB/GYN          Other                        Anesthesia Plan    ASA 2     MAC     (MAC anesthesia discussed with patient and/or patient representative. Risks (including but not limited to intra-op awareness), benefits, and alternatives were discussed. Understanding was voiced with an agreement to proceed with a MAC technique and General as a backup option. )    Anesthetic plan, risks, benefits, and alternatives have been provided, discussed and informed consent has been obtained with: patient.      CODE STATUS:

## 2023-07-31 LAB
LAB AP CASE REPORT: NORMAL
LAB AP CLINICAL INFORMATION: NORMAL
PATH REPORT.FINAL DX SPEC: NORMAL
PATH REPORT.GROSS SPEC: NORMAL

## 2023-08-01 ENCOUNTER — TELEPHONE (OUTPATIENT)
Dept: GASTROENTEROLOGY | Facility: CLINIC | Age: 63
End: 2023-08-01
Payer: COMMERCIAL

## 2023-10-05 RX ORDER — VALSARTAN AND HYDROCHLOROTHIAZIDE 160; 12.5 MG/1; MG/1
1 TABLET, FILM COATED ORAL DAILY
Qty: 90 TABLET | Refills: 1 | Status: SHIPPED | OUTPATIENT
Start: 2023-10-05

## 2023-11-09 ENCOUNTER — TELEPHONE (OUTPATIENT)
Dept: INTERNAL MEDICINE | Facility: CLINIC | Age: 63
End: 2023-11-09

## 2023-11-09 ENCOUNTER — OFFICE VISIT (OUTPATIENT)
Dept: INTERNAL MEDICINE | Facility: CLINIC | Age: 63
End: 2023-11-09
Payer: MEDICARE

## 2023-11-09 VITALS
SYSTOLIC BLOOD PRESSURE: 132 MMHG | HEART RATE: 102 BPM | HEIGHT: 70 IN | TEMPERATURE: 98.4 F | BODY MASS INDEX: 28.63 KG/M2 | OXYGEN SATURATION: 98 % | RESPIRATION RATE: 18 BRPM | DIASTOLIC BLOOD PRESSURE: 68 MMHG | WEIGHT: 200 LBS

## 2023-11-09 DIAGNOSIS — F17.210 NICOTINE DEPENDENCE, CIGARETTES, UNCOMPLICATED: ICD-10-CM

## 2023-11-09 DIAGNOSIS — D64.89 ANEMIA DUE TO OTHER CAUSE, NOT CLASSIFIED: ICD-10-CM

## 2023-11-09 DIAGNOSIS — Z00.00 ENCOUNTER FOR ANNUAL WELLNESS EXAM IN MEDICARE PATIENT: Primary | ICD-10-CM

## 2023-11-09 DIAGNOSIS — Z12.2 SCREENING FOR LUNG CANCER: ICD-10-CM

## 2023-11-09 DIAGNOSIS — I10 PRIMARY HYPERTENSION: Chronic | ICD-10-CM

## 2023-11-09 NOTE — PROGRESS NOTES
Subjective   Arpit Godoy is a 63 y.o. male.     Chief Complaint   Patient presents with    [facility] To Medicare    Hypertension    Anemia         Hypertension  This is a chronic problem. The current episode started more than 1 year ago. The problem is unchanged. Pertinent negatives include no chest pain, headaches, palpitations or shortness of breath.        The following portions of the patient's history were reviewed and updated as appropriate: allergies, current medications, past social history and problem list.    Outpatient Medications Marked as Taking for the 11/9/23 encounter (Office Visit) with Moe Haney MD   Medication Sig Dispense Refill    cetirizine (zyrTEC) 5 MG tablet Take 1 tablet by mouth Daily.      fluticasone (FLONASE) 50 MCG/ACT nasal spray 2 sprays into the nostril(s) as directed by provider Daily.      valsartan-hydrochlorothiazide (DIOVAN-HCT) 160-12.5 MG per tablet Take 1 tablet by mouth Daily. 90 tablet 1       Review of Systems   Respiratory:  Negative for shortness of breath and wheezing.    Cardiovascular:  Negative for chest pain, palpitations and leg swelling.   Gastrointestinal:  Positive for diarrhea ( occassional). Negative for constipation.   Allergic/Immunologic: Positive for environmental allergies.   Neurological:  Negative for headaches.       Objective   Vitals:    11/09/23 1500   BP: 132/68   Pulse: 102   Resp: 18   Temp: 98.4 °F (36.9 °C)   SpO2: 98%      Wt Readings from Last 3 Encounters:   11/09/23 90.7 kg (200 lb)   07/28/23 91.2 kg (201 lb)   10/27/22 95.3 kg (210 lb)    Body mass index is 28.7 kg/m².      Physical Exam  Constitutional:       Appearance: Normal appearance.   Cardiovascular:      Rate and Rhythm: Normal rate. Rhythm irregular.      Heart sounds: Murmur heard.      Crescendo decrescendo systolic murmur is present with a grade of 2/6.      No gallop.      Comments: Grade 2 MIKI throughout  Pulmonary:      Effort: Pulmonary effort is normal.  No respiratory distress.      Breath sounds: Normal breath sounds. No wheezing or rales.   Neurological:      Mental Status: He is alert.             Problems Addressed this Visit          Cardiac and Vasculature    Hypertension (Chronic)       Hematology and Neoplasia    Other specified anemias     Other Visit Diagnoses       Encounter for annual wellness exam in Medicare patient    -  Primary          Diagnoses         Codes Comments    Encounter for annual wellness exam in Medicare patient    -  Primary ICD-10-CM: Z00.00  ICD-9-CM: V70.0     Primary hypertension     ICD-10-CM: I10  ICD-9-CM: 401.9     Anemia due to other cause, not classified     ICD-10-CM: D64.89  ICD-9-CM: 285.8           Assessment & Plan   In for recheck of hypertension and history of anemia today November 2023.  Has a history of adenomatous colon polyps.  He does have a chronic smoking history as well as some alcohol excess.  Averages about 4 beers per day and is now trying to cut that down.  He has a 43-pack-year smoking history.  Trying to quit right now.  He had annual lab work October 2023 including CBC, CMP, lipids, PSA.  He is very much against any type of immunizations because he had some side effects to flu shots in the past.  He might consider them in the future but will not today.  Blood pressures had been running excellent.  He is due for his annual low-dose CT scan for lung cancer screening December 2023.  He has developed a mild anemia over the past 2 years and MCV is dropping ever so slightly.  We will move out to 6 months on visits.  He has questions about CT coronary calcium scoring we discussed that thoroughly.  His major/only modifiable risk factor is tobacco use and he is disinterested in quitting so the calcium score has little benefit for him.    The above information was reviewed again today 11/09/23.  It continues to be accurate as reflected above and is unchanged.  History, physical and review of systems all reviewed  and are unchanged.  Medications were reviewed today and continue the current dosing.    The 10-year ASCVD risk score (Ivette BAGLEY, et al., 2019) is: 14.7%    Values used to calculate the score:      Age: 63 years      Sex: Male      Is Non- : No      Diabetic: No      Tobacco smoker: Yes      Systolic Blood Pressure: 132 mmHg      Is BP treated: Yes      HDL Cholesterol: 67 mg/dL      Total Cholesterol: 147 mg/dL           Dragon disclaimer:   Much of this encounter note is an electronic transcription/translation of spoken language to printed text. The electronic translation of spoken language may permit erroneous, or at times, nonsensical words or phrases to be inadvertently transcribed; Although I have reviewed the note for such errors, some may still exist.

## 2023-11-09 NOTE — PROGRESS NOTES
The ABCs of the Annual Wellness Visit  [place] to Medicare Visit    Subjective     Arpit Godoy is a 63 y.o. male who presents for a  Welcome to Medicare Visit.    The following portions of the patient's history were reviewed and   updated as appropriate: allergies, current medications, past family history, past medical history, past social history, past surgical history, and problem list.     Compared to one year ago, the patient feels his physical   health is the same.    Compared to one year ago, the patient feels his mental   health is the same.    Recent Hospitalizations:  He was not admitted to the hospital during the last year.       Current Medical Providers:  Patient Care Team:  Moe Haney MD as PCP - General (Internal Medicine)    Outpatient Medications Prior to Visit   Medication Sig Dispense Refill    cetirizine (zyrTEC) 5 MG tablet Take 1 tablet by mouth Daily.      fluticasone (FLONASE) 50 MCG/ACT nasal spray 2 sprays into the nostril(s) as directed by provider Daily.      valsartan-hydrochlorothiazide (DIOVAN-HCT) 160-12.5 MG per tablet Take 1 tablet by mouth Daily. 90 tablet 1     No facility-administered medications prior to visit.       No opioid medication identified on active medication list. I have reviewed chart for other potential  high risk medication/s and harmful drug interactions in the elderly.        Aspirin is not on active medication list.  Aspirin use is not indicated based on review of current medical condition/s. Risk of harm outweighs potential benefits.  .    Patient Active Problem List   Diagnosis    Hypertension    ETOH abuse    History of adenomatous polyp of colon    Other specified anemias     Advance Care Planning   Advance Care Planning     Advance Directive is not on file.  ACP discussion was held with the patient during this visit. Patient has an advance directive (not in EMR), copy requested.       Objective   Vitals:    11/09/23 1500   BP: 132/68   Pulse: 102  "  Resp: 18   Temp: 98.4 °F (36.9 °C)   TempSrc: Temporal   SpO2: 98%   Weight: 90.7 kg (200 lb)   Height: 177.8 cm (70\")     Estimated body mass index is 28.7 kg/m² as calculated from the following:    Height as of this encounter: 177.8 cm (70\").    Weight as of this encounter: 90.7 kg (200 lb).           Does the patient have evidence of cognitive impairment?   No    Lab Results   Component Value Date    CHLPL 147 10/31/2023    TRIG 114 10/31/2023    HDL 67 10/31/2023    LDL 60 10/31/2023    VLDL 20 10/31/2023         ECG 12 Lead    Date/Time: 2023 5:25 PM  Performed by: Moe Haney MD    Authorized by: Moe Haney MD  Comparison: compared with previous ECG from 10/27/2022  Similar to previous ECG  Rhythm: sinus tachycardia  Ectopy: atrial premature contractions  Rate: tachycardic  Conduction: incomplete right bundle branch block  ST Segments: ST segments normal  T Waves: T waves normal  QRS axis: normal  Other: no other findings    Clinical impression: normal ECG  Comments: Sinus tachycardia.  Heart rate is 104.  There is an incomplete right bundle branch block.  PACs.  This is an abnormal EKG.  There is no change from the prior EKG dated 10/27/2022.               HEALTH RISK ASSESSMENT    Smoking Status:  Social History     Tobacco Use   Smoking Status Every Day    Packs/day: 0.50    Years: 51.00    Additional pack years: 0.00    Total pack years: 25.50    Types: Cigarettes    Last attempt to quit: 3/6/2021    Years since quittin.6   Smokeless Tobacco Never   Tobacco Comments    Former tobacco cigarette smoker quit about 6 weeks ago.  Smoked 1ppd for 38 years and .5 ppd for 10 years for a 43 pack year history.  Currently using a vape device with nicotine and flavoring.     Alcohol Consumption:  Social History     Substance and Sexual Activity   Alcohol Use Yes    Alcohol/week: 28.0 standard drinks of alcohol    Types: 28 Cans of beer per week    Comment: Reports he has cut back to 4 beers " nightly.       Fall Risk Screen:    AMILCAR Fall Risk Assessment was completed, and patient is at LOW risk for falls.Assessment completed on:2023    Depression Screen:       2023     3:04 PM   PHQ-2/PHQ-9 Depression Screening   Little Interest or Pleasure in Doing Things 0-->not at all   Feeling Down, Depressed or Hopeless 0-->not at all   PHQ-9: Brief Depression Severity Measure Score 0       Health Habits and Functional and Cognitive Screenin/9/2023     3:06 PM   Functional & Cognitive Status   Do you have difficulty preparing food and eating? No   Do you have difficulty bathing yourself, getting dressed or grooming yourself? No   Do you have difficulty using the toilet? No   Do you have difficulty moving around from place to place? No   Do you have trouble with steps or getting out of a bed or a chair? No   Current Diet Well Balanced Diet   Dental Exam Not up to date   Eye Exam Not up to date   Exercise (times per week) 0 times per week   Do you need help using the phone?  No   Are you deaf or do you have serious difficulty hearing?  Yes   Do you need help to go to places out of walking distance? No   Do you need help shopping? No   Do you need help preparing meals?  No   Do you need help with housework?  No   Do you need help with laundry? No   Do you need help taking your medications? No   Do you need help managing money? No   Do you ever drive or ride in a car without wearing a seat belt? No       Visual Acuity:    Vision Screening    Right eye Left eye Both eyes   Without correction 20/30 20/40 20/30   With correction          Age-appropriate Screening Schedule:  Refer to the list below for future screening recommendations based on patient's age, sex and/or medical conditions. Orders for these recommended tests are listed in the plan section. The patient has been provided with a written plan.    Health Maintenance   Topic Date Due    ZOSTER VACCINE (1 of 2) 2023 (Originally 2008)     COVID-19 Vaccine (1) 11/01/2024 (Originally 1960)    INFLUENZA VACCINE  11/01/2024 (Originally 8/1/2023)    Pneumococcal Vaccine 65+ (1 - PCV) 11/09/2024 (Originally 6/28/1966)    LUNG CANCER SCREENING  12/15/2023    BMI FOLLOWUP  05/09/2024    ANNUAL WELLNESS VISIT  11/09/2024    COLORECTAL CANCER SCREENING  07/28/2028    TDAP/TD VACCINES (2 - Td or Tdap) 03/12/2031    HEPATITIS C SCREENING  Completed    AAA SCREEN (ONE-TIME)  Completed        CMS Preventative Services Quick Reference  Risk Factors Identified During Encounter    Fall Risk-High or Moderate: Information on Fall Prevention Shared in After Visit Summary  The above risks/problems have been discussed with the patient.  Pertinent information has been shared with the patient in the After Visit Summary.  Follow up plans and orders are seen below in the Assessment/Plan Section.    Diagnoses and all orders for this visit:    1. Encounter for annual wellness exam in Medicare patient (Primary)  -     ECG 12 Lead    2. Primary hypertension    3. Anemia due to other cause, not classified    4. Screening for lung cancer  -     CT Chest Low Dose Wo; Future    5. Nicotine dependence, cigarettes, uncomplicated  -     CT Chest Low Dose Wo; Future        Follow Up:   Initial Medicare Visit in one year    An After Visit Summary and PPPS were made available to the patient.

## 2023-11-09 NOTE — TELEPHONE ENCOUNTER
Patient brought in a copy of ABN for his recent labs. Estimated cost for CBC $32.55 and lipid panel $102.90. Spoke to Frantz today to provide additional diagnosis of D64.89 Anemia, I10 Hypertension, and Z79.899 Medication managemnt.

## 2023-11-27 NOTE — Clinical Note
Daily Note     Today's date: 2023  Patient name: Alejandro Goodman  : [id number]  MRN: [id number]  Referring provider: Devika Alfred MD  Dx:   Encounter Diagnosis     ICD-10-CM    1. Acute left-sided low back pain with left-sided sciatica  M54.42                      Subjective: Pt states he is confident he will be ready to RTW 12/15 as planned and plans to D/C PT 2023. Pt reports no leg cramping in the past 2 weeks and no tingling for even longer amount of time. Objective: See treatment diary below      Assessment: Tolerated treatment well and has been tolerating progressions of resistance and asymmetrical activities w/o issue . Patient  should be ready for D/C as planned . Plan: Continue per plan of care.       Daily Exercise Log:  Start of Care: 23  RE: 2023  POC expires 2023  FOTO: 2023  Date 2023   Visit # 32 32  29 30   Manual                ROM/MMT        Neuro Re-Ed 20' 13'  20' 15'   EIL from qped 1x10 - no c/o   1x10; 2x during session for R calf cramping relief    ALICE w/ B/L knee flexion fem NG    2x10    Monster walk w/ TB @ ankles    Blue TB @ ankles 20'x1 fwd/bkwd; 2x Blue TB @ ankles 20'x2 fwd/bkwd; 2x   pallof Pallof posit side steps; dbl grn; 2 stepsx5 ea way; 2x Pallof posit side steps; dbl grn; 2 steps 5x ea way; 2x      Supine fig 4 stretch  20"x3 R/L      Sit sciatic NG LAQ w/ PD    Sit 1x10 R/L    Bridge w/ alt kick 2x10 2x10    Feet on pball bridges 5" 2x10   Elbow plank 10"x5; 2x    10"x5; 2x   Bird dog  Blk TB 5" 2x10 ea way   Blk TB 2x10 ea way   SL dead lift  15# 2x10 R/L   10# DB 2x10 R/L - some R LE relief 10# DB 2x10 R/L   steamboats Grn TB @ ankles 2x10 R/L       Ther Ex 10' 10'  10' 10'   RDL's 10# DB's 2x10 10# DB's 2x12  10# on cane 2x10    Knee hugs & soldier kicks Knee hugs w/ HR 20'x2; soldier kicks 20'x2 Knee hugs w/ HR 20'x2  Knee hugs 20'x4 Knee hugs 20'x4   Walking lunges  20'x2 10# DB's; 2x Please try to delete EKG from regular office visit note. 0# 20'x4 R calf cramping 20'x2; 2x  0#   Upright bike L3x5'   L3x5' L3x5'   Pt education        HEP        Ther Activity 15' 20'  15' 20'   Fwd step up w/ alt knee hike w/ Uni OH press 10" step 10# DB 2x10 R/L   10" step 10# DB 2x10 R/L    Chair squat (taps) w/ OH press Taps to low mat 15# DB's 2x10 Taps to low mat w/ HR & OH press; 15# DB's 2x12  Taps to low mat w/ OH press w/ HR  15# DB's 2x10 Taps to low mat w/ OH press w/ HR 15# DB's 2x10   Uni farmer's carry 25# 50'x2 ea R/L UE 30# 50'x2 ea R/L UE      Sled push/pull fwd/bwd   70# 20'x2 fwd/bkwd; 4 laps   70# 20'x2 fwd/bkwd; 2x   Box lift floor to waist/carry 30'/return to floor 25# box lift/carry 20'; 4x       Floor to waist & return box lift    25# 2x10 VC's for form 25# 2x10 w/ 180 deg pivot & turn    OH Pball wall dribble     1'x2   OH carry 10# kettlebell  1' ea R/L      Modalities                        HEP:  Access Code: [email or web address]  URL: [email or web address]  Date: 10/24/2023  Prepared by:  Ubaldo Sifuentes    Exercises  - Quadruped Hip Drops  - 2 x daily - 2 sets - 5 reps  - Prone on elbows with knee flexion  - 2 x daily - 2 sets - 10 reps  - Supine Hip External Rotation Stretch  - 1 x daily - 3 reps - 30 hold  - Supine Bridge with Resistance Band  - 1 x daily - 2 sets - 10 reps - 5 hold  - Runner's Step Up/Down  - 1 x daily - 2 sets - 10 reps  - Standing Anti-Rotation Press with Anchored Resistance  - 1 x daily - 2 sets - 10 reps  - Mini Squat to Shoulder Press with Barbell  - 1 x daily - 2 sets - 10 reps  - Forward Monster Walks  - 1 x daily - 3 sets - 10 reps  - Static Lunge  - 1 x daily - 2 sets - 10 reps  - Bird Dog with Resistance  - 1 x daily - 10 reps - 5 hold  - Single Leg Deadlift with Kettlebell  - 1 x daily - 2 sets - 10 reps    Daily Exercise Log:  Start of Care: 8/8/23  RE: 11/9/2023  POC expires 12/22/2023  FOTO: 10/31/2023  Date 11/1/2023 11/6/2023 11/9/2023 11/13/2023 11/20/2023   Visit # 26 27 28  RE/FOTO  29 30   Manual   5' ROM/MMT   TT     Neuro Re-Ed 30' 28' 15'     EIL from qped 1x10; 3x during session 1x10=NE on quad fatigue gradually resolved   1x10 to end  1x10 prod/NW L lat knee     ALICE w/ B/L knee flexion fem NG 2x10 2x10       Monster walk w/ TB @ ankles Grn TB @ ankles 20'x2 ea fwd/bkwd (L glut prod/NW)       1/2 knee pallof  [place] 10# 2x10 R/L       Supine fig 4 stretch 30"x2 R/L 30"x3 R/L         Sit sciatic NG LAQ w/ PD 1x10 supine w/ SOS 1x10 supine w/ SOS  1x10 supine w/ SOS R/L     Bridge w/ alt kick Bridge w/ alt kick 2x10 prod L glut 2nd set - relief w/ fig 4 Bridge w/ alt kick 2x10  R/L  2x10 R/L no c/o       Elbow plank  5" 2x5 prod/NW L lat knee/glut fold  5" 2x5 no noted symptoms   30"x1 (symptom onset @ 15")      Bird dog          5"x10 ea blk TB   Sup alt leg lowering (SOS to stab contra leg)    2x10 R/L         SL dead lift   10# DB 2x10 R/L  10# DB 2x10 R/L   10# DB 2x10 R/L - some R LE relief    SL dead lift w/ 10# DB 2x10 R/L  steamboats          Blue TB @ shins 1x15 R/L     Produced R calf cramping 4/10  Ther Ex  15'  10'      5'   RDL w/ cane for form cuing  10# kettlebell 2x10          qped to child's pose            Knee hugs & soldier kicks          25'x2 ea  Walking lunges  Walking lunges 25'x2; 2x  Walking lunges 20' 10# DB    R quad fatigue   10# DB's 20'x2 2/10 R calf cramping    Walking lunges 25'x2   Upright bike  L3x5'  L3x5'         Pt education            HEP            Ther Activity      25'    10'   Fwd step up w/ alt knee hike w/ Uni OH press          10# 8" 1x10 R/L   Chair squat (taps) w/ OH press    Taps to low mat 10# DB's w/ B/L HR   2x10   Taps to low mat 15# DB's 1x10 no HR no c/o      OH carry kettlebell      10# 90 sec no c/o    10# 1'x1 ea R/L   Sled push/pull fwd/bwd   55#     65# 20'x3 fwd/bkwd no c/o      Box lift floor to waist/carry 30'/return to floor      25# box lift/carry 40' & return; 2x no c/o      Floor to waist & return box lift      25# 1x10 5/10 R calf - sidra w/ BUDDY Rao                                    HEP:  Access Code: [email or web address]  URL: [email or web address]  Date: 10/24/2023  Prepared by:  Wiliam Degroot    Exercises  - Quadruped Hip Drops  - 2 x daily - 2 sets - 5 reps  - Prone on elbows with knee flexion  - 2 x daily - 2 sets - 10 reps  - Supine Hip External Rotation Stretch  - 1 x daily - 3 reps - 30 hold  - Supine Bridge with Resistance Band  - 1 x daily - 2 sets - 10 reps - 5 hold  - Runner's Step Up/Down  - 1 x daily - 2 sets - 10 reps  - Standing Anti-Rotation Press with Anchored Resistance  - 1 x daily - 2 sets - 10 reps  - Mini Squat to Shoulder Press with Barbell  - 1 x daily - 2 sets - 10 reps  - Forward Monster Walks  - 1 x daily - 3 sets - 10 reps  - Static Lunge  - 1 x daily - 2 sets - 10 reps  - Bird Dog with Resistance  - 1 x daily - 10 reps - 5 hold  - Single Leg Deadlift with Kettlebell  - 1 x daily - 2 sets - 10 reps bed mobility training/gait training/transfer training

## 2023-12-18 ENCOUNTER — HOSPITAL ENCOUNTER (OUTPATIENT)
Facility: HOSPITAL | Age: 63
Discharge: HOME | End: 2023-12-18
Admitting: INTERNAL MEDICINE
Payer: MEDICARE

## 2023-12-18 DIAGNOSIS — F17.210 NICOTINE DEPENDENCE, CIGARETTES, UNCOMPLICATED: ICD-10-CM

## 2023-12-18 DIAGNOSIS — Z12.2 SCREENING FOR LUNG CANCER: ICD-10-CM

## 2023-12-18 PROCEDURE — 71271 CT THORAX LUNG CANCER SCR C-: CPT

## 2024-04-11 RX ORDER — VALSARTAN AND HYDROCHLOROTHIAZIDE 160; 12.5 MG/1; MG/1
1 TABLET, FILM COATED ORAL DAILY
Qty: 90 TABLET | Refills: 1 | Status: SHIPPED | OUTPATIENT
Start: 2024-04-11

## 2024-10-10 RX ORDER — VALSARTAN AND HYDROCHLOROTHIAZIDE 160; 12.5 MG/1; MG/1
1 TABLET, FILM COATED ORAL DAILY
Qty: 90 TABLET | Refills: 1 | Status: SHIPPED | OUTPATIENT
Start: 2024-10-10

## 2024-11-01 DIAGNOSIS — D64.89 ANEMIA DUE TO OTHER CAUSE, NOT CLASSIFIED: ICD-10-CM

## 2024-11-01 DIAGNOSIS — Z12.5 SCREENING FOR PROSTATE CANCER: ICD-10-CM

## 2024-11-01 DIAGNOSIS — Z79.899 MEDICATION MANAGEMENT: ICD-10-CM

## 2024-11-01 DIAGNOSIS — I10 PRIMARY HYPERTENSION: Primary | Chronic | ICD-10-CM

## 2024-11-12 ENCOUNTER — OFFICE VISIT (OUTPATIENT)
Dept: INTERNAL MEDICINE | Facility: CLINIC | Age: 64
End: 2024-11-12
Payer: MEDICARE

## 2024-11-12 VITALS
BODY MASS INDEX: 28.06 KG/M2 | HEART RATE: 100 BPM | TEMPERATURE: 98.2 F | RESPIRATION RATE: 16 BRPM | HEIGHT: 70 IN | WEIGHT: 196 LBS | DIASTOLIC BLOOD PRESSURE: 76 MMHG | OXYGEN SATURATION: 99 % | SYSTOLIC BLOOD PRESSURE: 132 MMHG

## 2024-11-12 DIAGNOSIS — Z00.00 ENCOUNTER FOR ANNUAL WELLNESS EXAM IN MEDICARE PATIENT: Primary | ICD-10-CM

## 2024-11-12 DIAGNOSIS — F17.210 NICOTINE DEPENDENCE, CIGARETTES, UNCOMPLICATED: ICD-10-CM

## 2024-11-12 DIAGNOSIS — Z12.2 SCREENING FOR LUNG CANCER: ICD-10-CM

## 2024-11-12 DIAGNOSIS — R73.02 IGT (IMPAIRED GLUCOSE TOLERANCE): ICD-10-CM

## 2024-11-12 DIAGNOSIS — I10 PRIMARY HYPERTENSION: Chronic | ICD-10-CM

## 2024-11-12 LAB
ALBUMIN SERPL-MCNC: 4.7 G/DL (ref 3.9–4.9)
ALP SERPL-CCNC: 78 IU/L (ref 44–121)
ALT SERPL-CCNC: 21 IU/L (ref 0–44)
AST SERPL-CCNC: 19 IU/L (ref 0–40)
BASOPHILS # BLD AUTO: 0.1 X10E3/UL (ref 0–0.2)
BASOPHILS NFR BLD AUTO: 1 %
BILIRUB SERPL-MCNC: 0.8 MG/DL (ref 0–1.2)
BUN SERPL-MCNC: 14 MG/DL (ref 8–27)
BUN/CREAT SERPL: 15 (ref 10–24)
CALCIUM SERPL-MCNC: 9.2 MG/DL (ref 8.6–10.2)
CHLORIDE SERPL-SCNC: 98 MMOL/L (ref 96–106)
CHOLEST SERPL-MCNC: 160 MG/DL (ref 100–199)
CHOLEST/HDLC SERPL: 2.7 RATIO (ref 0–5)
CO2 SERPL-SCNC: 23 MMOL/L (ref 20–29)
CREAT SERPL-MCNC: 0.95 MG/DL (ref 0.76–1.27)
EGFRCR SERPLBLD CKD-EPI 2021: 88 ML/MIN/1.73
EOSINOPHIL # BLD AUTO: 0.3 X10E3/UL (ref 0–0.4)
EOSINOPHIL NFR BLD AUTO: 3 %
ERYTHROCYTE [DISTWIDTH] IN BLOOD BY AUTOMATED COUNT: 11.1 % (ref 11.6–15.4)
GLOBULIN SER CALC-MCNC: 2.1 G/DL (ref 1.5–4.5)
GLUCOSE SERPL-MCNC: 104 MG/DL (ref 70–99)
HCT VFR BLD AUTO: 43.8 % (ref 37.5–51)
HDLC SERPL-MCNC: 59 MG/DL
HGB BLD-MCNC: 15.3 G/DL (ref 13–17.7)
IMM GRANULOCYTES # BLD AUTO: 0.1 X10E3/UL (ref 0–0.1)
IMM GRANULOCYTES NFR BLD AUTO: 1 %
LDLC SERPL CALC-MCNC: 70 MG/DL (ref 0–99)
LYMPHOCYTES # BLD AUTO: 2.4 X10E3/UL (ref 0.7–3.1)
LYMPHOCYTES NFR BLD AUTO: 23 %
MCH RBC QN AUTO: 33 PG (ref 26.6–33)
MCHC RBC AUTO-ENTMCNC: 34.9 G/DL (ref 31.5–35.7)
MCV RBC AUTO: 95 FL (ref 79–97)
MONOCYTES # BLD AUTO: 0.7 X10E3/UL (ref 0.1–0.9)
MONOCYTES NFR BLD AUTO: 7 %
NEUTROPHILS # BLD AUTO: 6.9 X10E3/UL (ref 1.4–7)
NEUTROPHILS NFR BLD AUTO: 65 %
PLATELET # BLD AUTO: 279 X10E3/UL (ref 150–450)
POTASSIUM SERPL-SCNC: 4.2 MMOL/L (ref 3.5–5.2)
PROT SERPL-MCNC: 6.8 G/DL (ref 6–8.5)
PSA SERPL-MCNC: 0.5 NG/ML (ref 0–4)
RBC # BLD AUTO: 4.63 X10E6/UL (ref 4.14–5.8)
SODIUM SERPL-SCNC: 136 MMOL/L (ref 134–144)
TRIGL SERPL-MCNC: 188 MG/DL (ref 0–149)
VLDLC SERPL CALC-MCNC: 31 MG/DL (ref 5–40)
WBC # BLD AUTO: 10.4 X10E3/UL (ref 3.4–10.8)

## 2024-11-12 PROCEDURE — 3078F DIAST BP <80 MM HG: CPT | Performed by: INTERNAL MEDICINE

## 2024-11-12 PROCEDURE — 99214 OFFICE O/P EST MOD 30 MIN: CPT | Performed by: INTERNAL MEDICINE

## 2024-11-12 PROCEDURE — 1125F AMNT PAIN NOTED PAIN PRSNT: CPT | Performed by: INTERNAL MEDICINE

## 2024-11-12 PROCEDURE — 1160F RVW MEDS BY RX/DR IN RCRD: CPT | Performed by: INTERNAL MEDICINE

## 2024-11-12 PROCEDURE — 1170F FXNL STATUS ASSESSED: CPT | Performed by: INTERNAL MEDICINE

## 2024-11-12 PROCEDURE — 3075F SYST BP GE 130 - 139MM HG: CPT | Performed by: INTERNAL MEDICINE

## 2024-11-12 PROCEDURE — G0438 PPPS, INITIAL VISIT: HCPCS | Performed by: INTERNAL MEDICINE

## 2024-11-12 PROCEDURE — 1159F MED LIST DOCD IN RCRD: CPT | Performed by: INTERNAL MEDICINE

## 2024-11-12 NOTE — PROGRESS NOTES
Subjective   The ABCs of the Annual Wellness Visit  Medicare Wellness Visit      Arpit Godoy is a 64 y.o. patient who presents for a Medicare Wellness Visit.    The following portions of the patient's history were reviewed and   updated as appropriate: allergies, current medications, past family history, past medical history, past social history, past surgical history, and problem list.    Compared to one year ago, the patient's physical   health is worse.  Compared to one year ago, the patient's mental   health is worse.    Recent Hospitalizations:  He was not admitted to the hospital during the last year.     Current Medical Providers:  Patient Care Team:  Moe Haney MD as PCP - General (Internal Medicine)    Outpatient Medications Prior to Visit   Medication Sig Dispense Refill    cetirizine (zyrTEC) 5 MG tablet Take 1 tablet by mouth Daily.      fluticasone (FLONASE) 50 MCG/ACT nasal spray Administer 2 sprays into the nostril(s) as directed by provider Daily.      valsartan-hydrochlorothiazide (DIOVAN-HCT) 160-12.5 MG per tablet TAKE 1 TABLET BY MOUTH DAILY 90 tablet 1     No facility-administered medications prior to visit.     No opioid medication identified on active medication list. I have reviewed chart for other potential  high risk medication/s and harmful drug interactions in the elderly.      Aspirin is not on active medication list.  Aspirin use is not indicated based on review of current medical condition/s. Risk of harm outweighs potential benefits.  .    Patient Active Problem List   Diagnosis    Hypertension    ETOH abuse    History of adenomatous polyp of colon    Other specified anemias     Advance Care Planning Advance Directive is not on file.  ACP discussion was held with the patient during this visit. Patient has an advance directive (not in EMR), copy requested.            Objective   Vitals:    11/12/24 1457   BP: 132/76   Pulse: 100   Resp: 16   Temp: 98.2 °F (36.8 °C)  "  TempSrc: Temporal   SpO2: 99%   Weight: 88.9 kg (196 lb)   Height: 177.8 cm (70\")   PainSc:   4       Estimated body mass index is 28.12 kg/m² as calculated from the following:    Height as of this encounter: 177.8 cm (70\").    Weight as of this encounter: 88.9 kg (196 lb).    BMI is >= 25 and <30. (Overweight) The following options were offered after discussion;: exercise counseling/recommendations       Does the patient have evidence of cognitive impairment? No  Lab Results   Component Value Date    CHLPL 160 2024    TRIG 188 (H) 2024    HDL 59 2024    LDL 70 2024    VLDL 31 2024                                                                                               Health  Risk Assessment    Smoking Status:  Social History     Tobacco Use   Smoking Status Every Day    Current packs/day: 0.00    Average packs/day: 0.5 packs/day for 51.0 years (25.5 ttl pk-yrs)    Types: Cigarettes    Start date: 3/6/1970    Last attempt to quit: 3/6/2021    Years since quitting: 3.6   Smokeless Tobacco Never   Tobacco Comments    Former tobacco cigarette smoker quit about 6 weeks ago.  Smoked 1ppd for 38 years and .5 ppd for 10 years for a 43 pack year history.  Currently using a vape device with nicotine and flavoring.     Alcohol Consumption:  Social History     Substance and Sexual Activity   Alcohol Use Yes    Alcohol/week: 28.0 standard drinks of alcohol    Types: 28 Cans of beer per week    Comment: Reports he has cut back to 4 beers nightly.       Fall Risk Screen  STEADI Fall Risk Assessment was completed, and patient is at LOW risk for falls.Assessment completed on:2024    Depression Screening   Little interest or pleasure in doing things? Not at all   Feeling down, depressed, or hopeless? Not at all   PHQ-2 Total Score 0      Health Habits and Functional and Cognitive Screenin/12/2024     2:59 PM   Functional & Cognitive Status   Do you have difficulty preparing food " and eating? No   Do you have difficulty bathing yourself, getting dressed or grooming yourself? No   Do you have difficulty using the toilet? No   Do you have difficulty moving around from place to place? No   Do you have trouble with steps or getting out of a bed or a chair? No   Current Diet Well Balanced Diet   Dental Exam Not up to date   Eye Exam Not up to date   Exercise (times per week) 0 times per week   Current Exercises Include No Regular Exercise   Do you need help using the phone?  No   Are you deaf or do you have serious difficulty hearing?  No   Do you need help to go to places out of walking distance? No   Do you need help shopping? No   Do you need help preparing meals?  No   Do you need help with housework?  No   Do you need help with laundry? No   Do you need help taking your medications? No   Do you need help managing money? No   Do you ever drive or ride in a car without wearing a seat belt? No   Have you felt unusual stress, anger or loneliness in the last month? No   Who do you live with? Spouse   If you need help, do you have trouble finding someone available to you? No   Have you been bothered in the last four weeks by sexual problems? No   Do you have difficulty concentrating, remembering or making decisions? No           Age-appropriate Screening Schedule:  Refer to the list below for future screening recommendations based on patient's age, sex and/or medical conditions. Orders for these recommended tests are listed in the plan section. The patient has been provided with a written plan.    Health Maintenance List  Health Maintenance   Topic Date Due    BMI FOLLOWUP  05/09/2024    ANNUAL WELLNESS VISIT  11/09/2024    LUNG CANCER SCREENING  12/18/2024    ZOSTER VACCINE (1 of 2) 11/12/2024 (Originally 6/28/2008)    COVID-19 Vaccine (1 - 2024-25 season) 11/14/2024 (Originally 9/1/2024)    INFLUENZA VACCINE  03/31/2025 (Originally 8/1/2024)    Pneumococcal Vaccine 65+ (1 of 2 - PCV) 11/12/2025  (Originally 6/28/1966)    COLORECTAL CANCER SCREENING  07/28/2028    TDAP/TD VACCINES (2 - Td or Tdap) 03/12/2031    HEPATITIS C SCREENING  Completed    AAA SCREEN (ONE-TIME)  Completed                                                                                                                                                CMS Preventative Services Quick Reference  Risk Factors Identified During Encounter  Fall Risk-High or Moderate: Information on Fall Prevention Shared in After Visit Summary  Normal Balance     The above risks/problems have been discussed with the patient.  Pertinent information has been shared with the patient in the After Visit Summary.  An After Visit Summary and PPPS were made available to the patient.    Follow Up:   Next Medicare Wellness visit to be scheduled in 1 year.     Assessment & Plan  Encounter for annual wellness exam in Medicare patient         Primary hypertension                Follow Up:   No follow-ups on file.

## 2024-11-12 NOTE — PROGRESS NOTES
Subjective   Arpit Godoy is a 64 y.o. male.     Chief Complaint   Patient presents with    Medicare Wellness-subsequent    Hypertension         Hypertension  This is a chronic problem. The current episode started more than 1 year ago. The problem is unchanged. Pertinent negatives include no chest pain, headaches, palpitations or shortness of breath.        The following portions of the patient's history were reviewed and updated as appropriate: allergies, current medications, past social history and problem list.    Outpatient Medications Marked as Taking for the 11/12/24 encounter (Office Visit) with Moe Haney MD   Medication Sig Dispense Refill    cetirizine (zyrTEC) 5 MG tablet Take 1 tablet by mouth Daily.      fluticasone (FLONASE) 50 MCG/ACT nasal spray Administer 2 sprays into the nostril(s) as directed by provider Daily.      valsartan-hydrochlorothiazide (DIOVAN-HCT) 160-12.5 MG per tablet TAKE 1 TABLET BY MOUTH DAILY 90 tablet 1       Review of Systems   Respiratory:  Negative for shortness of breath and wheezing.    Cardiovascular:  Negative for chest pain, palpitations and leg swelling.   Gastrointestinal:  Positive for diarrhea ( occassional). Negative for constipation.   Neurological:  Negative for headaches.       Objective   Vitals:    11/12/24 1457   BP: 132/76   Pulse: 100   Resp: 16   Temp: 98.2 °F (36.8 °C)   SpO2: 99%      Wt Readings from Last 3 Encounters:   11/12/24 88.9 kg (196 lb)   11/09/23 90.7 kg (200 lb)   07/28/23 91.2 kg (201 lb)    Body mass index is 28.12 kg/m².      Physical Exam  Constitutional:       Appearance: Normal appearance.   Cardiovascular:      Rate and Rhythm: Normal rate. Rhythm irregular.      Heart sounds: Murmur heard.      Crescendo decrescendo systolic murmur is present with a grade of 2/6.      No gallop.      Comments: Grade 2 MIKI throughout  Pulmonary:      Effort: Pulmonary effort is normal. No respiratory distress.      Breath sounds:  Normal breath sounds. No wheezing or rales.   Neurological:      Mental Status: He is alert.             Problems Addressed this Visit          Cardiac and Vasculature    Hypertension (Chronic)                      Other Visit Diagnoses       Encounter for annual wellness exam in Medicare patient    -  Primary          Diagnoses         Codes Comments    Encounter for annual wellness exam in Medicare patient    -  Primary ICD-10-CM: Z00.00  ICD-9-CM: V70.0     Primary hypertension     ICD-10-CM: I10  ICD-9-CM: 401.9           Assessment & Plan   In for recheck of hypertension, IGT, history of anemia and annual wellness visit today November 2024.  Has a history of adenomatous colon polyps.  He does have a chronic smoking history as well as some alcohol excess.  Averages about 6 beers per day and is now trying to cut that down.  He has a 44-pack-year smoking history.  Not trying to quit right now.  He had annual lab work October 2024 including CBC, CMP, lipids, A1c and PSA.  He is very much against any type of immunizations because he had some side effects to flu shots in the past.  He might consider them in the future but will not today.  Blood pressures had been running excellent.  He is due for his annual low-dose CT scan for lung cancer screening December 2024.  We will move out to 6 months on visits.  He has questions about CT coronary calcium scoring we previously discussed that thoroughly.  His major/only modifiable risk factor is tobacco use and he is disinterested in quitting so the calcium score has little benefit for him.    The above information was reviewed again today 11/12/24.  It continues to be accurate as reflected above and is unchanged.  History, physical and review of systems all reviewed and are unchanged.  Medications were reviewed today and continue the current dosing.      The 10-year ASCVD risk score (Ivette BAGLEY, et al., 2019) is: 17.7%    Values used to calculate the score:      Age: 64  years      Sex: Male      Is Non- : No      Diabetic: No      Tobacco smoker: Yes      Systolic Blood Pressure: 132 mmHg      Is BP treated: Yes      HDL Cholesterol: 59 mg/dL      Total Cholesterol: 160 mg/dL           Dragon disclaimer:   Much of this encounter note is an electronic transcription/translation of spoken language to printed text. The electronic translation of spoken language may permit erroneous, or at times, nonsensical words or phrases to be inadvertently transcribed; Although I have reviewed the note for such errors, some may still exist.

## 2024-11-22 LAB
HBA1C MFR BLD: 6 % (ref 4.8–5.6)
WRITTEN AUTHORIZATION: NORMAL

## 2025-01-15 RX ORDER — VALSARTAN AND HYDROCHLOROTHIAZIDE 160; 12.5 MG/1; MG/1
1 TABLET, FILM COATED ORAL DAILY
Qty: 90 TABLET | Refills: 1 | Status: SHIPPED | OUTPATIENT
Start: 2025-01-15

## 2025-01-18 ENCOUNTER — HOSPITAL ENCOUNTER (OUTPATIENT)
Facility: HOSPITAL | Age: 65
Discharge: HOME | End: 2025-01-18
Payer: MEDICARE

## 2025-01-18 DIAGNOSIS — F17.210 NICOTINE DEPENDENCE, CIGARETTES, UNCOMPLICATED: ICD-10-CM

## 2025-01-18 DIAGNOSIS — Z12.2 SCREENING FOR LUNG CANCER: ICD-10-CM

## 2025-01-18 PROCEDURE — 71271 CT THORAX LUNG CANCER SCR C-: CPT

## 2025-05-16 LAB
GLUCOSE SERPL-MCNC: 102 MG/DL (ref 70–99)
HBA1C MFR BLD: 5.6 % (ref 4.8–5.6)

## 2025-05-22 ENCOUNTER — OFFICE VISIT (OUTPATIENT)
Dept: INTERNAL MEDICINE | Facility: CLINIC | Age: 65
End: 2025-05-22
Payer: MEDICARE

## 2025-05-22 VITALS
OXYGEN SATURATION: 97 % | RESPIRATION RATE: 16 BRPM | SYSTOLIC BLOOD PRESSURE: 130 MMHG | TEMPERATURE: 97 F | HEART RATE: 88 BPM | WEIGHT: 199 LBS | HEIGHT: 70 IN | DIASTOLIC BLOOD PRESSURE: 70 MMHG | BODY MASS INDEX: 28.49 KG/M2

## 2025-05-22 DIAGNOSIS — Z72.0 TOBACCO USE: ICD-10-CM

## 2025-05-22 DIAGNOSIS — R73.02 IGT (IMPAIRED GLUCOSE TOLERANCE): ICD-10-CM

## 2025-05-22 DIAGNOSIS — I10 PRIMARY HYPERTENSION: Primary | Chronic | ICD-10-CM

## 2025-05-22 NOTE — PROGRESS NOTES
Subjective   Arpit Godoy is a 64 y.o. male.     Chief Complaint   Patient presents with    Hypertension    IGT         Hypertension  Chronicity:  Chronic  Onset:  More than 1 year ago  Progression since onset:  Unchanged  Associated symptoms: no chest pain, no headaches, no palpitations and no shortness of breath         The following portions of the patient's history were reviewed and updated as appropriate: allergies, current medications, past social history and problem list.    Outpatient Medications Marked as Taking for the 5/22/25 encounter (Office Visit) with oMe Haney MD   Medication Sig Dispense Refill    cetirizine (zyrTEC) 5 MG tablet Take 1 tablet by mouth Daily.      fluticasone (FLONASE) 50 MCG/ACT nasal spray Administer 2 sprays into the nostril(s) as directed by provider Daily.      valsartan-hydrochlorothiazide (DIOVAN-HCT) 160-12.5 MG per tablet TAKE 1 TABLET BY MOUTH DAILY 90 tablet 1       Review of Systems   Respiratory:  Negative for shortness of breath and wheezing.    Cardiovascular:  Negative for chest pain, palpitations and leg swelling.   Gastrointestinal:  Positive for diarrhea ( occassional). Negative for constipation.   Neurological:  Negative for headaches.       Objective   Vitals:    05/22/25 1508   BP: 130/70   Pulse: 88   Resp: 16   Temp: 97 °F (36.1 °C)   SpO2: 97%      Wt Readings from Last 3 Encounters:   05/22/25 90.3 kg (199 lb)   11/12/24 88.9 kg (196 lb)   11/09/23 90.7 kg (200 lb)    Body mass index is 28.55 kg/m².      Physical Exam  Constitutional:       Appearance: Normal appearance.   Cardiovascular:      Rate and Rhythm: Normal rate. Rhythm irregular.      Heart sounds: Murmur heard.      Crescendo decrescendo systolic murmur is present with a grade of 2/6.      No gallop.      Comments: Grade 2 MIKI throughout, prematures, slight tachycardia  Pulmonary:      Effort: Pulmonary effort is normal. No respiratory distress.      Breath sounds: Normal breath  sounds. No wheezing or rales.   Neurological:      Mental Status: He is alert.             Problems Addressed this Visit          Cardiac and Vasculature    Hypertension - Primary (Chronic)       Endocrine and Metabolic    IGT (impaired glucose tolerance)     Diagnoses         Codes Comments      Primary hypertension    -  Primary ICD-10-CM: I10  ICD-9-CM: 401.9       IGT (impaired glucose tolerance)     ICD-10-CM: R73.02  ICD-9-CM: 790.22           Assessment & Plan   In for 6-month recheck of hypertension, IGT and history of anemia today May 2025.  Has a history of adenomatous colon polyps.  He does have a chronic smoking history as well as some alcohol excess.  Averages about 6 beers per day and is now trying to cut that down.  He has a 44-pack-year smoking history.  Smokes between one half and 1 pack/day.  Not trying to quit right now.  He had annual lab work October 2024 including CBC, CMP, lipids, A1c and PSA.  He is very much against any type of immunizations because he had some side effects to flu shots in the past.  He might consider them in the future but will not today.  Blood pressures had been running excellent.  He is due for his annual low-dose CT scan for lung cancer screening December 2024.  We will move out to 6 months on visits.  He has questions about CT coronary calcium scoring we previously discussed that thoroughly.  His major/only modifiable risk factor is tobacco use and he is disinterested in quitting so the calcium score has little benefit for him.    The above information was reviewed again today 05/22/25.  It continues to be accurate as reflected above and is unchanged.  History, physical and review of systems all reviewed and are unchanged.  Medications were reviewed today and continue the current dosing.      The 10-year ASCVD risk score (Ivette BAGLEY, et al., 2019) is: 17.3%    Values used to calculate the score:      Age: 64 years      Sex: Male      Is Non- :  No      Diabetic: No      Tobacco smoker: Yes      Systolic Blood Pressure: 130 mmHg      Is BP treated: Yes      HDL Cholesterol: 59 mg/dL      Total Cholesterol: 160 mg/dL           Dragon disclaimer:   Much of this encounter note is an electronic transcription/translation of spoken language to printed text. The electronic translation of spoken language may permit erroneous, or at times, nonsensical words or phrases to be inadvertently transcribed; Although I have reviewed the note for such errors, some may still exist.

## 2025-06-25 ENCOUNTER — TELEPHONE (OUTPATIENT)
Dept: INTERNAL MEDICINE | Facility: CLINIC | Age: 65
End: 2025-06-25

## 2025-06-25 NOTE — TELEPHONE ENCOUNTER
I would need to see the patient in order to evaluate this problem to see if he really needs an MRI or whether he would qualify for an MRI.  This type of test requires authorization which requires an office visit to document the issues.  Schedule him a visit that he would like.  There is no guarantee that I will agree with the chiropractor however.  But at least we could discussed the issues and the reasons for this.

## 2025-06-25 NOTE — TELEPHONE ENCOUNTER
"Caller: Arpit Godoy \"Kody\"    Relationship: Self    Best call back number:    [phone] (Home)       What orders are you requesting (i.e. lab or imaging): MRI OF NECK     In what timeframe would the patient need to come in: ASAP    Where will you receive your lab/imaging services:     Additional notes: PATIENT STATES THAT HIS CHIROPRACTOR WANTS PATIENT TO HAVE AN MRI OF NECK DUE TO PAIN, POPPING AND GRINDING.         THANKS  "

## 2025-06-27 ENCOUNTER — TELEPHONE (OUTPATIENT)
Dept: INTERNAL MEDICINE | Facility: CLINIC | Age: 65
End: 2025-06-27
Payer: COMMERCIAL

## 2025-06-27 NOTE — TELEPHONE ENCOUNTER
Patient states that he is not going to come in for an appointment and pay for the visit. States that he will pay for the MRI out of pocket.

## 2025-06-27 NOTE — TELEPHONE ENCOUNTER
LVM to return call. Advise patient he needs to schedule and appt with Dr Haney. We received a fax to order an MRI from his Chiropractor requested Dr Haney order, but we need an appt for that.

## 2025-07-15 RX ORDER — VALSARTAN AND HYDROCHLOROTHIAZIDE 160; 12.5 MG/1; MG/1
1 TABLET, FILM COATED ORAL DAILY
Qty: 90 TABLET | Refills: 1 | Status: SHIPPED | OUTPATIENT
Start: 2025-07-15

## (undated) DEVICE — ADAPT CLN BIOGUARD AIR/H2O DISP

## (undated) DEVICE — THE SINGLE USE ETRAP – POLYP TRAP IS USED FOR SUCTION RETRIEVAL OF ENDOSCOPICALLY REMOVED POLYPS.: Brand: ETRAP

## (undated) DEVICE — TUBING, SUCTION, 1/4" X 10', STRAIGHT: Brand: MEDLINE

## (undated) DEVICE — LN SMPL CO2 SHTRM SD STREAM W/M LUER

## (undated) DEVICE — KT ORCA ORCAPOD DISP STRL

## (undated) DEVICE — SENSR O2 OXIMAX FNGR A/ 18IN NONSTR

## (undated) DEVICE — SNAR POLYP CAPTIVATOR RND STFF 2.4 240CM 10MM 1P/U

## (undated) DEVICE — CANN O2 ETCO2 FITS ALL CONN CO2 SMPL A/ 7IN DISP LF